# Patient Record
Sex: MALE | Race: OTHER | NOT HISPANIC OR LATINO | Employment: UNEMPLOYED | URBAN - NONMETROPOLITAN AREA
[De-identification: names, ages, dates, MRNs, and addresses within clinical notes are randomized per-mention and may not be internally consistent; named-entity substitution may affect disease eponyms.]

---

## 2021-01-29 ENCOUNTER — APPOINTMENT (EMERGENCY)
Dept: CT IMAGING | Facility: HOSPITAL | Age: 46
DRG: 720 | End: 2021-01-29
Payer: COMMERCIAL

## 2021-01-29 ENCOUNTER — APPOINTMENT (EMERGENCY)
Dept: RADIOLOGY | Facility: HOSPITAL | Age: 46
DRG: 720 | End: 2021-01-29
Payer: COMMERCIAL

## 2021-01-29 ENCOUNTER — HOSPITAL ENCOUNTER (INPATIENT)
Facility: HOSPITAL | Age: 46
LOS: 1 days | DRG: 720 | End: 2021-01-30
Attending: EMERGENCY MEDICINE | Admitting: FAMILY MEDICINE
Payer: COMMERCIAL

## 2021-01-29 DIAGNOSIS — R06.00 DYSPNEA: ICD-10-CM

## 2021-01-29 DIAGNOSIS — E03.9 HYPOTHYROIDISM: ICD-10-CM

## 2021-01-29 DIAGNOSIS — J90 PLEURAL EFFUSION: ICD-10-CM

## 2021-01-29 DIAGNOSIS — R07.9 CHEST PAIN: Primary | ICD-10-CM

## 2021-01-29 DIAGNOSIS — J90 PLEURAL EFFUSION, LEFT: ICD-10-CM

## 2021-01-29 DIAGNOSIS — J18.9 PNEUMONIA: ICD-10-CM

## 2021-01-29 LAB
ALBUMIN SERPL BCP-MCNC: 3 G/DL (ref 3.5–5)
ALP SERPL-CCNC: 119 U/L (ref 46–116)
ALT SERPL W P-5'-P-CCNC: 24 U/L (ref 12–78)
ANION GAP SERPL CALCULATED.3IONS-SCNC: 14 MMOL/L (ref 4–13)
AST SERPL W P-5'-P-CCNC: 13 U/L (ref 5–45)
BASOPHILS # BLD AUTO: 0.02 THOUSANDS/ΜL (ref 0–0.1)
BASOPHILS NFR BLD AUTO: 0 % (ref 0–1)
BILIRUB SERPL-MCNC: 0.89 MG/DL (ref 0.2–1)
BUN SERPL-MCNC: 15 MG/DL (ref 5–25)
CALCIUM ALBUM COR SERPL-MCNC: 10.1 MG/DL (ref 8.3–10.1)
CALCIUM SERPL-MCNC: 9.3 MG/DL (ref 8.3–10.1)
CHLORIDE SERPL-SCNC: 97 MMOL/L (ref 100–108)
CO2 SERPL-SCNC: 24 MMOL/L (ref 21–32)
CREAT SERPL-MCNC: 1.16 MG/DL (ref 0.6–1.3)
CRP SERPL QL: 370.5 MG/L
EOSINOPHIL # BLD AUTO: 0.19 THOUSAND/ΜL (ref 0–0.61)
EOSINOPHIL NFR BLD AUTO: 2 % (ref 0–6)
ERYTHROCYTE [DISTWIDTH] IN BLOOD BY AUTOMATED COUNT: 13.1 % (ref 11.6–15.1)
ERYTHROCYTE [SEDIMENTATION RATE] IN BLOOD: 130 MM/HOUR (ref 0–14)
FLUAV RNA RESP QL NAA+PROBE: NEGATIVE
FLUBV RNA RESP QL NAA+PROBE: NEGATIVE
GFR SERPL CREATININE-BSD FRML MDRD: 76 ML/MIN/1.73SQ M
GLUCOSE SERPL-MCNC: 101 MG/DL (ref 65–140)
HCT VFR BLD AUTO: 42.6 % (ref 36.5–49.3)
HGB BLD-MCNC: 14.2 G/DL (ref 12–17)
HIV 1+2 AB+HIV1 P24 AG SERPL QL IA: NORMAL
HIV1 P24 AG SER QL: NORMAL
IMM GRANULOCYTES # BLD AUTO: 0.04 THOUSAND/UL (ref 0–0.2)
IMM GRANULOCYTES NFR BLD AUTO: 0 % (ref 0–2)
LACTATE SERPL-SCNC: 1.1 MMOL/L (ref 0.5–2)
LYMPHOCYTES # BLD AUTO: 1.1 THOUSANDS/ΜL (ref 0.6–4.47)
LYMPHOCYTES NFR BLD AUTO: 8 % (ref 14–44)
MCH RBC QN AUTO: 30.7 PG (ref 26.8–34.3)
MCHC RBC AUTO-ENTMCNC: 33.3 G/DL (ref 31.4–37.4)
MCV RBC AUTO: 92 FL (ref 82–98)
MONOCYTES # BLD AUTO: 1.13 THOUSAND/ΜL (ref 0.17–1.22)
MONOCYTES NFR BLD AUTO: 9 % (ref 4–12)
NEUTROPHILS # BLD AUTO: 10.55 THOUSANDS/ΜL (ref 1.85–7.62)
NEUTS SEG NFR BLD AUTO: 81 % (ref 43–75)
NRBC BLD AUTO-RTO: 0 /100 WBCS
NT-PROBNP SERPL-MCNC: 26 PG/ML
PLATELET # BLD AUTO: 425 THOUSANDS/UL (ref 149–390)
PMV BLD AUTO: 9.8 FL (ref 8.9–12.7)
POTASSIUM SERPL-SCNC: 3.5 MMOL/L (ref 3.5–5.3)
PROT SERPL-MCNC: 8.5 G/DL (ref 6.4–8.2)
RBC # BLD AUTO: 4.62 MILLION/UL (ref 3.88–5.62)
RSV RNA RESP QL NAA+PROBE: NEGATIVE
SARS-COV-2 RNA RESP QL NAA+PROBE: NEGATIVE
SODIUM SERPL-SCNC: 135 MMOL/L (ref 136–145)
TROPONIN I SERPL-MCNC: <0.02 NG/ML
TSH SERPL DL<=0.05 MIU/L-ACNC: 8.52 UIU/ML (ref 0.36–3.74)
WBC # BLD AUTO: 13.03 THOUSAND/UL (ref 4.31–10.16)

## 2021-01-29 PROCEDURE — 71045 X-RAY EXAM CHEST 1 VIEW: CPT

## 2021-01-29 PROCEDURE — 96360 HYDRATION IV INFUSION INIT: CPT

## 2021-01-29 PROCEDURE — 80053 COMPREHEN METABOLIC PANEL: CPT | Performed by: EMERGENCY MEDICINE

## 2021-01-29 PROCEDURE — 84145 PROCALCITONIN (PCT): CPT | Performed by: EMERGENCY MEDICINE

## 2021-01-29 PROCEDURE — 87806 HIV AG W/HIV1&2 ANTB W/OPTIC: CPT | Performed by: EMERGENCY MEDICINE

## 2021-01-29 PROCEDURE — 86140 C-REACTIVE PROTEIN: CPT | Performed by: EMERGENCY MEDICINE

## 2021-01-29 PROCEDURE — 85025 COMPLETE CBC W/AUTO DIFF WBC: CPT | Performed by: EMERGENCY MEDICINE

## 2021-01-29 PROCEDURE — 85652 RBC SED RATE AUTOMATED: CPT | Performed by: EMERGENCY MEDICINE

## 2021-01-29 PROCEDURE — 71260 CT THORAX DX C+: CPT

## 2021-01-29 PROCEDURE — 84443 ASSAY THYROID STIM HORMONE: CPT | Performed by: EMERGENCY MEDICINE

## 2021-01-29 PROCEDURE — 0241U HB NFCT DS VIR RESP RNA 4 TRGT: CPT | Performed by: EMERGENCY MEDICINE

## 2021-01-29 PROCEDURE — 99291 CRITICAL CARE FIRST HOUR: CPT | Performed by: EMERGENCY MEDICINE

## 2021-01-29 PROCEDURE — 93005 ELECTROCARDIOGRAM TRACING: CPT

## 2021-01-29 PROCEDURE — 83605 ASSAY OF LACTIC ACID: CPT | Performed by: EMERGENCY MEDICINE

## 2021-01-29 PROCEDURE — 87040 BLOOD CULTURE FOR BACTERIA: CPT | Performed by: EMERGENCY MEDICINE

## 2021-01-29 PROCEDURE — 99285 EMERGENCY DEPT VISIT HI MDM: CPT

## 2021-01-29 PROCEDURE — 84484 ASSAY OF TROPONIN QUANT: CPT | Performed by: EMERGENCY MEDICINE

## 2021-01-29 PROCEDURE — 83880 ASSAY OF NATRIURETIC PEPTIDE: CPT | Performed by: EMERGENCY MEDICINE

## 2021-01-29 PROCEDURE — 99223 1ST HOSP IP/OBS HIGH 75: CPT | Performed by: FAMILY MEDICINE

## 2021-01-29 PROCEDURE — 36415 COLL VENOUS BLD VENIPUNCTURE: CPT | Performed by: EMERGENCY MEDICINE

## 2021-01-29 PROCEDURE — 84439 ASSAY OF FREE THYROXINE: CPT | Performed by: FAMILY MEDICINE

## 2021-01-29 RX ORDER — DOCUSATE SODIUM 100 MG/1
100 CAPSULE, LIQUID FILLED ORAL 2 TIMES DAILY PRN
Status: DISCONTINUED | OUTPATIENT
Start: 2021-01-29 | End: 2021-01-30 | Stop reason: HOSPADM

## 2021-01-29 RX ORDER — SODIUM CHLORIDE 9 MG/ML
125 INJECTION, SOLUTION INTRAVENOUS CONTINUOUS
Status: DISCONTINUED | OUTPATIENT
Start: 2021-01-29 | End: 2021-01-29

## 2021-01-29 RX ORDER — HYDROMORPHONE HCL/PF 1 MG/ML
1 SYRINGE (ML) INJECTION EVERY 4 HOURS PRN
Status: DISCONTINUED | OUTPATIENT
Start: 2021-01-29 | End: 2021-01-30 | Stop reason: HOSPADM

## 2021-01-29 RX ORDER — SODIUM CHLORIDE AND POTASSIUM CHLORIDE .9; .15 G/100ML; G/100ML
100 SOLUTION INTRAVENOUS CONTINUOUS
Status: DISCONTINUED | OUTPATIENT
Start: 2021-01-29 | End: 2021-01-30 | Stop reason: HOSPADM

## 2021-01-29 RX ORDER — ASPIRIN 81 MG/1
81 TABLET ORAL
COMMUNITY
End: 2021-02-03 | Stop reason: HOSPADM

## 2021-01-29 RX ORDER — ONDANSETRON 2 MG/ML
4 INJECTION INTRAMUSCULAR; INTRAVENOUS EVERY 4 HOURS PRN
Status: DISCONTINUED | OUTPATIENT
Start: 2021-01-29 | End: 2021-01-30 | Stop reason: HOSPADM

## 2021-01-29 RX ORDER — MORPHINE SULFATE 4 MG/ML
4 INJECTION, SOLUTION INTRAMUSCULAR; INTRAVENOUS ONCE
Status: COMPLETED | OUTPATIENT
Start: 2021-01-29 | End: 2021-01-29

## 2021-01-29 RX ORDER — HEPARIN SODIUM 5000 [USP'U]/ML
5000 INJECTION, SOLUTION INTRAVENOUS; SUBCUTANEOUS EVERY 8 HOURS SCHEDULED
Status: DISCONTINUED | OUTPATIENT
Start: 2021-01-29 | End: 2021-01-30

## 2021-01-29 RX ORDER — MAGNESIUM HYDROXIDE/ALUMINUM HYDROXICE/SIMETHICONE 120; 1200; 1200 MG/30ML; MG/30ML; MG/30ML
30 SUSPENSION ORAL EVERY 6 HOURS PRN
Status: DISCONTINUED | OUTPATIENT
Start: 2021-01-29 | End: 2021-01-30 | Stop reason: HOSPADM

## 2021-01-29 RX ORDER — NICOTINE 21 MG/24HR
1 PATCH, TRANSDERMAL 24 HOURS TRANSDERMAL DAILY
Status: DISCONTINUED | OUTPATIENT
Start: 2021-01-30 | End: 2021-01-30

## 2021-01-29 RX ORDER — ACETAMINOPHEN 325 MG/1
650 TABLET ORAL EVERY 6 HOURS PRN
Status: DISCONTINUED | OUTPATIENT
Start: 2021-01-29 | End: 2021-01-30 | Stop reason: HOSPADM

## 2021-01-29 RX ADMIN — SODIUM CHLORIDE 1000 ML: 0.9 INJECTION, SOLUTION INTRAVENOUS at 17:34

## 2021-01-29 RX ADMIN — SODIUM CHLORIDE 125 ML/HR: 0.9 INJECTION, SOLUTION INTRAVENOUS at 19:57

## 2021-01-29 RX ADMIN — SODIUM CHLORIDE AND POTASSIUM CHLORIDE 100 ML/HR: .9; .15 SOLUTION INTRAVENOUS at 23:56

## 2021-01-29 RX ADMIN — CEFTRIAXONE SODIUM 1000 MG: 10 INJECTION, POWDER, FOR SOLUTION INTRAVENOUS at 22:15

## 2021-01-29 RX ADMIN — DOXYCYCLINE 100 MG: 100 INJECTION, POWDER, LYOPHILIZED, FOR SOLUTION INTRAVENOUS at 22:53

## 2021-01-29 RX ADMIN — HEPARIN SODIUM 5000 UNITS: 5000 INJECTION INTRAVENOUS; SUBCUTANEOUS at 23:26

## 2021-01-29 RX ADMIN — IOHEXOL 85 ML: 350 INJECTION, SOLUTION INTRAVENOUS at 19:17

## 2021-01-29 RX ADMIN — MORPHINE SULFATE 4 MG: 4 INJECTION INTRAVENOUS at 19:24

## 2021-01-30 ENCOUNTER — APPOINTMENT (INPATIENT)
Dept: RADIOLOGY | Facility: HOSPITAL | Age: 46
DRG: 720 | End: 2021-01-30
Attending: RADIOLOGY
Payer: COMMERCIAL

## 2021-01-30 ENCOUNTER — HOSPITAL ENCOUNTER (INPATIENT)
Facility: HOSPITAL | Age: 46
LOS: 4 days | Discharge: HOME | DRG: 720 | End: 2021-02-03
Attending: HOSPITALIST | Admitting: HOSPITALIST
Payer: COMMERCIAL

## 2021-01-30 VITALS
HEIGHT: 72 IN | HEART RATE: 106 BPM | OXYGEN SATURATION: 91 % | DIASTOLIC BLOOD PRESSURE: 87 MMHG | SYSTOLIC BLOOD PRESSURE: 121 MMHG | BODY MASS INDEX: 33.26 KG/M2 | TEMPERATURE: 98.8 F | WEIGHT: 245.6 LBS | RESPIRATION RATE: 16 BRPM

## 2021-01-30 DIAGNOSIS — J45.20 MILD INTERMITTENT ASTHMA, UNSPECIFIED WHETHER COMPLICATED: ICD-10-CM

## 2021-01-30 DIAGNOSIS — J90 PLEURAL EFFUSION, LEFT: Primary | ICD-10-CM

## 2021-01-30 DIAGNOSIS — Z72.0 TOBACCO ABUSE: Chronic | ICD-10-CM

## 2021-01-30 DIAGNOSIS — R93.89 ABNORMAL FINDING ON CT SCAN: ICD-10-CM

## 2021-01-30 DIAGNOSIS — K59.00 CONSTIPATION: ICD-10-CM

## 2021-01-30 DIAGNOSIS — E03.9 ACQUIRED HYPOTHYROIDISM: Chronic | ICD-10-CM

## 2021-01-30 DIAGNOSIS — A41.9 SEPSIS (HCC): ICD-10-CM

## 2021-01-30 LAB
ALBUMIN SERPL BCP-MCNC: 2.2 G/DL (ref 3.5–5)
ALP SERPL-CCNC: 102 U/L (ref 46–116)
ALT SERPL W P-5'-P-CCNC: 19 U/L (ref 12–78)
AMPHETAMINES SERPL QL SCN: NEGATIVE
ANION GAP SERPL CALCULATED.3IONS-SCNC: 14 MMOL/L (ref 4–13)
APPEARANCE FLD: NORMAL
AST SERPL W P-5'-P-CCNC: 13 U/L (ref 5–45)
BACTERIA UR QL AUTO: NORMAL /HPF
BARBITURATES UR QL: NEGATIVE
BASOPHILS # BLD AUTO: 0.03 THOUSANDS/ΜL (ref 0–0.1)
BASOPHILS NFR BLD AUTO: 0 % (ref 0–1)
BENZODIAZ UR QL: NEGATIVE
BILIRUB SERPL-MCNC: 0.62 MG/DL (ref 0.2–1)
BILIRUB UR QL STRIP: ABNORMAL
BUN SERPL-MCNC: 12 MG/DL (ref 5–25)
CALCIUM ALBUM COR SERPL-MCNC: 9.7 MG/DL (ref 8.3–10.1)
CALCIUM SERPL-MCNC: 8.3 MG/DL (ref 8.3–10.1)
CHLORIDE SERPL-SCNC: 101 MMOL/L (ref 100–108)
CHOLEST FLD-MCNC: 121 MG/DL
CHOLEST SERPL-MCNC: 147 MG/DL (ref 50–200)
CLARITY UR: CLEAR
CO2 SERPL-SCNC: 18 MMOL/L (ref 21–32)
COCAINE UR QL: NEGATIVE
COLOR FLD: YELLOW
COLOR UR: ABNORMAL
CREAT SERPL-MCNC: 0.88 MG/DL (ref 0.6–1.3)
EOSINOPHIL # BLD AUTO: 0.32 THOUSAND/ΜL (ref 0–0.61)
EOSINOPHIL NFR BLD AUTO: 3 % (ref 0–6)
ERYTHROCYTE [DISTWIDTH] IN BLOOD BY AUTOMATED COUNT: 13.2 % (ref 11.6–15.1)
GFR SERPL CREATININE-BSD FRML MDRD: 104 ML/MIN/1.73SQ M
GLUCOSE FLD-MCNC: <1 MG/DL
GLUCOSE SERPL-MCNC: 79 MG/DL (ref 65–140)
GLUCOSE UR STRIP-MCNC: NEGATIVE MG/DL
HCT VFR BLD AUTO: 38.9 % (ref 36.5–49.3)
HDLC SERPL-MCNC: 29 MG/DL
HGB BLD-MCNC: 12.8 G/DL (ref 12–17)
HGB UR QL STRIP.AUTO: NEGATIVE
IMM GRANULOCYTES # BLD AUTO: 0.05 THOUSAND/UL (ref 0–0.2)
IMM GRANULOCYTES NFR BLD AUTO: 1 % (ref 0–2)
INR PPP: 1.04 (ref 0.84–1.19)
KETONES UR STRIP-MCNC: ABNORMAL MG/DL
LDH FLD L TO P-CCNC: 3268 U/L
LDH SERPL-CCNC: 119 U/L (ref 81–234)
LDH SERPL-CCNC: 123 U/L (ref 81–234)
LDLC SERPL CALC-MCNC: 101 MG/DL (ref 0–100)
LEUKOCYTE ESTERASE UR QL STRIP: NEGATIVE
LYMPHOCYTES # BLD AUTO: 1.35 THOUSANDS/ΜL (ref 0.6–4.47)
LYMPHOCYTES NFR BLD AUTO: 13 % (ref 14–44)
LYMPHOCYTES NFR BLD AUTO: 8 %
MAGNESIUM SERPL-MCNC: 1.8 MG/DL (ref 1.6–2.6)
MCH RBC QN AUTO: 30.7 PG (ref 26.8–34.3)
MCHC RBC AUTO-ENTMCNC: 32.9 G/DL (ref 31.4–37.4)
MCV RBC AUTO: 93 FL (ref 82–98)
METHADONE UR QL: NEGATIVE
MONO+MESO NFR FLD MANUAL: 1 %
MONOCYTES # BLD AUTO: 0.89 THOUSAND/ΜL (ref 0.17–1.22)
MONOCYTES NFR BLD AUTO: 6 %
MONOCYTES NFR BLD AUTO: 9 % (ref 4–12)
NEUTROPHILS # BLD AUTO: 7.7 THOUSANDS/ΜL (ref 1.85–7.62)
NEUTS SEG NFR BLD AUTO: 74 % (ref 43–75)
NEUTS SEG NFR BLD AUTO: 85 %
NITRITE UR QL STRIP: NEGATIVE
NON-SQ EPI CELLS URNS QL MICRO: NORMAL /HPF
NRBC BLD AUTO-RTO: 0 /100 WBCS
OPIATES UR QL SCN: POSITIVE
OXYCODONE+OXYMORPHONE UR QL SCN: NEGATIVE
PCP UR QL: NEGATIVE
PH BODY FLUID: 7.5
PH UR STRIP.AUTO: 6.5 [PH]
PHOSPHATE SERPL-MCNC: 2.4 MG/DL (ref 2.7–4.5)
PLATELET # BLD AUTO: 382 THOUSANDS/UL (ref 149–390)
PMV BLD AUTO: 9.3 FL (ref 8.9–12.7)
POTASSIUM SERPL-SCNC: 3.7 MMOL/L (ref 3.5–5.3)
PROCALCITONIN SERPL-MCNC: 0.42 NG/ML
PROT FLD-MCNC: 5.7 G/DL
PROT SERPL-MCNC: 7 G/DL (ref 6.4–8.2)
PROT UR STRIP-MCNC: ABNORMAL MG/DL
PROTHROMBIN TIME: 13.4 SECONDS (ref 11.6–14.5)
RBC # BLD AUTO: 4.17 MILLION/UL (ref 3.88–5.62)
RBC #/AREA URNS AUTO: NORMAL /HPF
SITE: NORMAL
SODIUM SERPL-SCNC: 133 MMOL/L (ref 136–145)
SP GR UR STRIP.AUTO: 1.01 (ref 1–1.03)
T4 FREE SERPL-MCNC: 0.99 NG/DL (ref 0.76–1.46)
THC UR QL: POSITIVE
TOTAL CELLS COUNTED SPEC: 100
TRIGL FLD-MCNC: 54 MG/DL
TRIGL SERPL-MCNC: 86 MG/DL
UROBILINOGEN UR QL STRIP.AUTO: 2 E.U./DL
WBC # BLD AUTO: 10.34 THOUSAND/UL (ref 4.31–10.16)
WBC # FLD MANUAL: 6658 /UL
WBC #/AREA URNS AUTO: NORMAL /HPF

## 2021-01-30 PROCEDURE — NC001 PR NO CHARGE: Performed by: RADIOLOGY

## 2021-01-30 PROCEDURE — 85610 PROTHROMBIN TIME: CPT | Performed by: FAMILY MEDICINE

## 2021-01-30 PROCEDURE — 84478 ASSAY OF TRIGLYCERIDES: CPT | Performed by: FAMILY MEDICINE

## 2021-01-30 PROCEDURE — 32557 INSERT CATH PLEURA W/ IMAGE: CPT

## 2021-01-30 PROCEDURE — 82465 ASSAY BLD/SERUM CHOLESTEROL: CPT | Performed by: FAMILY MEDICINE

## 2021-01-30 PROCEDURE — 83615 LACTATE (LD) (LDH) ENZYME: CPT | Performed by: STUDENT IN AN ORGANIZED HEALTH CARE EDUCATION/TRAINING PROGRAM

## 2021-01-30 PROCEDURE — 80061 LIPID PANEL: CPT | Performed by: FAMILY MEDICINE

## 2021-01-30 PROCEDURE — 81001 URINALYSIS AUTO W/SCOPE: CPT | Performed by: FAMILY MEDICINE

## 2021-01-30 PROCEDURE — 87070 CULTURE OTHR SPECIMN AEROBIC: CPT | Performed by: FAMILY MEDICINE

## 2021-01-30 PROCEDURE — 80053 COMPREHEN METABOLIC PANEL: CPT | Performed by: FAMILY MEDICINE

## 2021-01-30 PROCEDURE — 88112 CYTOPATH CELL ENHANCE TECH: CPT | Performed by: PATHOLOGY

## 2021-01-30 PROCEDURE — 89051 BODY FLUID CELL COUNT: CPT | Performed by: FAMILY MEDICINE

## 2021-01-30 PROCEDURE — 32557 INSERT CATH PLEURA W/ IMAGE: CPT | Performed by: RADIOLOGY

## 2021-01-30 PROCEDURE — 88305 TISSUE EXAM BY PATHOLOGIST: CPT | Performed by: PATHOLOGY

## 2021-01-30 PROCEDURE — C1729 CATH, DRAINAGE: HCPCS

## 2021-01-30 PROCEDURE — C1769 GUIDE WIRE: HCPCS

## 2021-01-30 PROCEDURE — 87449 NOS EACH ORGANISM AG IA: CPT | Performed by: FAMILY MEDICINE

## 2021-01-30 PROCEDURE — 87205 SMEAR GRAM STAIN: CPT | Performed by: FAMILY MEDICINE

## 2021-01-30 PROCEDURE — 83615 LACTATE (LD) (LDH) ENZYME: CPT | Performed by: NURSE PRACTITIONER

## 2021-01-30 PROCEDURE — 83735 ASSAY OF MAGNESIUM: CPT | Performed by: FAMILY MEDICINE

## 2021-01-30 PROCEDURE — 83615 LACTATE (LD) (LDH) ENZYME: CPT | Performed by: FAMILY MEDICINE

## 2021-01-30 PROCEDURE — 99239 HOSP IP/OBS DSCHRG MGMT >30: CPT | Performed by: FAMILY MEDICINE

## 2021-01-30 PROCEDURE — 83986 ASSAY PH BODY FLUID NOS: CPT | Performed by: FAMILY MEDICINE

## 2021-01-30 PROCEDURE — 84100 ASSAY OF PHOSPHORUS: CPT | Performed by: FAMILY MEDICINE

## 2021-01-30 PROCEDURE — 84157 ASSAY OF PROTEIN OTHER: CPT | Performed by: FAMILY MEDICINE

## 2021-01-30 PROCEDURE — NC001 PR NO CHARGE: Performed by: HOSPITALIST

## 2021-01-30 PROCEDURE — 80307 DRUG TEST PRSMV CHEM ANLYZR: CPT | Performed by: FAMILY MEDICINE

## 2021-01-30 PROCEDURE — 82945 GLUCOSE OTHER FLUID: CPT | Performed by: FAMILY MEDICINE

## 2021-01-30 PROCEDURE — 99152 MOD SED SAME PHYS/QHP 5/>YRS: CPT | Performed by: RADIOLOGY

## 2021-01-30 PROCEDURE — 85025 COMPLETE CBC W/AUTO DIFF WBC: CPT | Performed by: FAMILY MEDICINE

## 2021-01-30 RX ORDER — LIDOCAINE WITH 8.4% SOD BICARB 0.9%(10ML)
SYRINGE (ML) INJECTION CODE/TRAUMA/SEDATION MEDICATION
Status: COMPLETED | OUTPATIENT
Start: 2021-01-30 | End: 2021-01-30

## 2021-01-30 RX ORDER — ONDANSETRON 2 MG/ML
4 INJECTION INTRAMUSCULAR; INTRAVENOUS EVERY 4 HOURS PRN
Status: CANCELLED | OUTPATIENT
Start: 2021-01-30

## 2021-01-30 RX ORDER — SODIUM CHLORIDE AND POTASSIUM CHLORIDE .9; .15 G/100ML; G/100ML
100 SOLUTION INTRAVENOUS CONTINUOUS
Status: CANCELLED | OUTPATIENT
Start: 2021-01-30

## 2021-01-30 RX ORDER — FENTANYL CITRATE 50 UG/ML
INJECTION, SOLUTION INTRAMUSCULAR; INTRAVENOUS CODE/TRAUMA/SEDATION MEDICATION
Status: COMPLETED | OUTPATIENT
Start: 2021-01-30 | End: 2021-01-30

## 2021-01-30 RX ORDER — ONDANSETRON 2 MG/ML
4 INJECTION INTRAMUSCULAR; INTRAVENOUS EVERY 4 HOURS PRN
Status: DISCONTINUED | OUTPATIENT
Start: 2021-01-30 | End: 2021-02-03 | Stop reason: HOSPADM

## 2021-01-30 RX ORDER — ACETAMINOPHEN 325 MG/1
650 TABLET ORAL EVERY 6 HOURS PRN
Status: CANCELLED | OUTPATIENT
Start: 2021-01-30

## 2021-01-30 RX ORDER — DOCUSATE SODIUM 100 MG/1
100 CAPSULE, LIQUID FILLED ORAL 2 TIMES DAILY PRN
Status: CANCELLED | OUTPATIENT
Start: 2021-01-30

## 2021-01-30 RX ORDER — DOCUSATE SODIUM 100 MG/1
100 CAPSULE, LIQUID FILLED ORAL 2 TIMES DAILY PRN
Status: DISCONTINUED | OUTPATIENT
Start: 2021-01-30 | End: 2021-02-03 | Stop reason: HOSPADM

## 2021-01-30 RX ORDER — LEVOTHYROXINE SODIUM 0.07 MG/1
75 TABLET ORAL
Status: DISCONTINUED | OUTPATIENT
Start: 2021-01-31 | End: 2021-01-30

## 2021-01-30 RX ORDER — LEVOTHYROXINE SODIUM 0.07 MG/1
75 TABLET ORAL
Status: CANCELLED | OUTPATIENT
Start: 2021-01-31

## 2021-01-30 RX ORDER — MIDAZOLAM HYDROCHLORIDE 2 MG/2ML
INJECTION, SOLUTION INTRAMUSCULAR; INTRAVENOUS CODE/TRAUMA/SEDATION MEDICATION
Status: COMPLETED | OUTPATIENT
Start: 2021-01-30 | End: 2021-01-30

## 2021-01-30 RX ORDER — NICOTINE 21 MG/24HR
21 PATCH, TRANSDERMAL 24 HOURS TRANSDERMAL DAILY
Status: DISCONTINUED | OUTPATIENT
Start: 2021-01-31 | End: 2021-01-31

## 2021-01-30 RX ORDER — SODIUM CHLORIDE AND POTASSIUM CHLORIDE .9; .15 G/100ML; G/100ML
100 SOLUTION INTRAVENOUS CONTINUOUS
Status: DISCONTINUED | OUTPATIENT
Start: 2021-01-30 | End: 2021-01-31

## 2021-01-30 RX ORDER — HYDROMORPHONE HCL/PF 1 MG/ML
1 SYRINGE (ML) INJECTION EVERY 4 HOURS PRN
Status: DISCONTINUED | OUTPATIENT
Start: 2021-01-30 | End: 2021-02-03 | Stop reason: HOSPADM

## 2021-01-30 RX ORDER — HYDROMORPHONE HCL/PF 1 MG/ML
1 SYRINGE (ML) INJECTION EVERY 4 HOURS PRN
Status: CANCELLED | OUTPATIENT
Start: 2021-01-30

## 2021-01-30 RX ORDER — SODIUM CHLORIDE 9 MG/ML
75 INJECTION, SOLUTION INTRAVENOUS CONTINUOUS
Status: CANCELLED | OUTPATIENT
Start: 2021-01-30

## 2021-01-30 RX ORDER — SODIUM CHLORIDE, SODIUM GLUCONATE, SODIUM ACETATE, POTASSIUM CHLORIDE, MAGNESIUM CHLORIDE, SODIUM PHOSPHATE, DIBASIC, AND POTASSIUM PHOSPHATE .53; .5; .37; .037; .03; .012; .00082 G/100ML; G/100ML; G/100ML; G/100ML; G/100ML; G/100ML; G/100ML
100 INJECTION, SOLUTION INTRAVENOUS CONTINUOUS
Status: DISCONTINUED | OUTPATIENT
Start: 2021-01-30 | End: 2021-01-30

## 2021-01-30 RX ORDER — LEVOTHYROXINE SODIUM 0.07 MG/1
75 TABLET ORAL
Status: DISCONTINUED | OUTPATIENT
Start: 2021-01-30 | End: 2021-01-30 | Stop reason: HOSPADM

## 2021-01-30 RX ORDER — ALBUTEROL SULFATE 90 UG/1
2 AEROSOL, METERED RESPIRATORY (INHALATION) EVERY 4 HOURS PRN
Status: DISCONTINUED | OUTPATIENT
Start: 2021-01-30 | End: 2021-02-03 | Stop reason: HOSPADM

## 2021-01-30 RX ORDER — ACETAMINOPHEN 325 MG/1
650 TABLET ORAL EVERY 6 HOURS PRN
Status: DISCONTINUED | OUTPATIENT
Start: 2021-01-30 | End: 2021-01-31

## 2021-01-30 RX ADMIN — MIDAZOLAM 0.5 MG: 1 INJECTION INTRAMUSCULAR; INTRAVENOUS at 19:57

## 2021-01-30 RX ADMIN — LEVOTHYROXINE SODIUM 75 MCG: 75 TABLET ORAL at 05:08

## 2021-01-30 RX ADMIN — HYDROMORPHONE HYDROCHLORIDE 1 MG: 1 INJECTION, SOLUTION INTRAMUSCULAR; INTRAVENOUS; SUBCUTANEOUS at 23:53

## 2021-01-30 RX ADMIN — SODIUM CHLORIDE AND POTASSIUM CHLORIDE 100 ML/HR: .9; .15 SOLUTION INTRAVENOUS at 09:49

## 2021-01-30 RX ADMIN — PIPERACILLIN AND TAZOBACTAM 3.38 G: 36; 4.5 INJECTION, POWDER, FOR SOLUTION INTRAVENOUS at 21:06

## 2021-01-30 RX ADMIN — SODIUM CHLORIDE AND POTASSIUM CHLORIDE 100 ML/HR: .9; .15 SOLUTION INTRAVENOUS at 19:11

## 2021-01-30 RX ADMIN — PIPERACILLIN AND TAZOBACTAM 3.38 G: 3; .375 INJECTION, POWDER, LYOPHILIZED, FOR SOLUTION INTRAVENOUS at 12:54

## 2021-01-30 RX ADMIN — FENTANYL CITRATE 25 MCG: 50 INJECTION INTRAMUSCULAR; INTRAVENOUS at 20:03

## 2021-01-30 RX ADMIN — HYDROMORPHONE HYDROCHLORIDE 1 MG: 1 INJECTION, SOLUTION INTRAMUSCULAR; INTRAVENOUS; SUBCUTANEOUS at 06:09

## 2021-01-30 RX ADMIN — VANCOMYCIN HYDROCHLORIDE 1250 MG: 5 INJECTION, POWDER, LYOPHILIZED, FOR SOLUTION INTRAVENOUS at 13:39

## 2021-01-30 RX ADMIN — FENTANYL CITRATE 25 MCG: 50 INJECTION INTRAMUSCULAR; INTRAVENOUS at 19:58

## 2021-01-30 RX ADMIN — VANCOMYCIN HYDROCHLORIDE 1250 MG: 10 INJECTION, POWDER, LYOPHILIZED, FOR SOLUTION INTRAVENOUS at 22:12

## 2021-01-30 RX ADMIN — DOXYCYCLINE 100 MG: 100 INJECTION, POWDER, LYOPHILIZED, FOR SOLUTION INTRAVENOUS at 09:47

## 2021-01-30 RX ADMIN — HEPARIN SODIUM 5000 UNITS: 5000 INJECTION INTRAVENOUS; SUBCUTANEOUS at 05:08

## 2021-01-30 RX ADMIN — ACETAMINOPHEN 650 MG: 325 TABLET ORAL at 22:13

## 2021-01-30 RX ADMIN — Medication 10 ML: at 20:05

## 2021-01-30 RX ADMIN — HYDROMORPHONE HYDROCHLORIDE 1 MG: 1 INJECTION, SOLUTION INTRAMUSCULAR; INTRAVENOUS; SUBCUTANEOUS at 15:00

## 2021-01-30 NOTE — TELEMEDICINE
INTERPROFESSIONAL (PHONE) CONSULTATION - Interventional Radiology  Farrah Herndon 39 y o  male MRN: [id number]  Unit/Bed#: -01 Encounter: [id number]    IR has been consulted to evaluate the patient, determine the appropriate procedure, and whether or not a procedure can and should be performed regarding the care of Farrah Herndon     We were consulted by [facility] concerning a moderate to large possibly infected left effusion, and to possibly perform a chest tube placement if medically appropriate for the patient  IP Consult to IR  Consult performed by: Mena Brenner MD  Consult ordered by: Mark Figueroa DO        01/30/21      Assessment/Recommendation:   49-year-old female with rapidly enlarging left effusion  The effusion may be loculated by imaging on CT  Patient meets 3 of 4 sirs criteria  Currently he is hemodynamically stable with mild tachycardia  His O2 sat is between 89 and 91 at rest on room air  The effusion is largely basilar, and is amenable to percutaneous drainage  Plan to place a chest tube today under image guidance  If there is poor output or the effusion looks very loculated, will consider pharmacologic intervention with tPA/Pulmonase  Total time spent in review of data, discussion with requesting provider and rendering advice was 15 minutes  Patient or appropriate family member was verbally informed by SLIM of this consultative service on their behalf to provide more timely access to specialty care in lieu of an in person consultation  They were informed it is a billable service unless the it was determined an in person follow up was medically necessary by me within the next 14 days at which time only the in person consult would be billed  Verbal consent was obtained  Thank you for allowing Interventional Radiology to participate in the care of Farrah Herndon  Please don't hesitate to call or TigerText us with any questions       Mena Brenner MD

## 2021-01-30 NOTE — PLAN OF CARE
Problem: PAIN - ADULT  Goal: Verbalizes/displays adequate comfort level or baseline comfort level  Description: Interventions:  - Encourage patient to monitor pain and request assistance  - Assess pain using appropriate pain scale  - Administer analgesics based on type and severity of pain and evaluate response  - Implement non-pharmacological measures as appropriate and evaluate response  - Consider cultural and social influences on pain and pain management  - Notify physician/advanced practitioner if interventions unsuccessful or patient reports new pain  Outcome: Progressing     Problem: INFECTION - ADULT  Goal: Absence or prevention of progression during hospitalization  Description: INTERVENTIONS:  - Assess and monitor for signs and symptoms of infection  - Monitor lab/diagnostic results  - Monitor all insertion sites, i e  indwelling lines, tubes, and drains  - Monitor endotracheal if appropriate and nasal secretions for changes in amount and color  - [place] appropriate cooling/warming therapies per order  - Administer medications as ordered  - Instruct and encourage patient and family to use good hand hygiene technique  - Identify and instruct in appropriate isolation precautions for identified infection/condition  Outcome: Progressing     Problem: SAFETY ADULT  Goal: Patient will remain free of falls  Description: INTERVENTIONS:  - Assess patient frequently for physical needs  -  Identify cognitive and physical deficits and behaviors that affect risk of falls    -  [place] fall precautions as indicated by assessment   - Educate patient/family on patient safety including physical limitations  - Instruct patient to call for assistance with activity based on assessment  - Modify environment to reduce risk of injury  - Consider OT/PT consult to assist with strengthening/mobility  Outcome: Progressing  Goal: Maintain or return to baseline ADL function  Description: INTERVENTIONS:  -  Assess patient's ability to carry out ADLs; assess patient's baseline for ADL function and identify physical deficits which impact ability to perform ADLs (bathing, care of mouth/teeth, toileting, grooming, dressing, etc )  - Assess/evaluate cause of self-care deficits   - Assess range of motion  - Assess patient's mobility; develop plan if impaired  - Assess patient's need for assistive devices and provide as appropriate  - Encourage maximum independence but intervene and supervise when necessary  - Involve family in performance of ADLs  - Assess for home care needs following discharge   - Consider OT consult to assist with ADL evaluation and planning for discharge  - Provide patient education as appropriate  Outcome: Progressing  Goal: Maintain or return mobility status to optimal level  Description: INTERVENTIONS:  - Assess patient's baseline mobility status (ambulation, transfers, stairs, etc )    - Identify cognitive and physical deficits and behaviors that affect mobility  - Identify mobility aids required to assist with transfers and/or ambulation (gait belt, sit-to-stand, lift, walker, cane, etc )  - [place] fall precautions as indicated by assessment  - Record patient progress and toleration of activity level on Mobility SBAR; progress patient to next Phase/Stage  - Instruct patient to call for assistance with activity based on assessment  - Consider rehabilitation consult to assist with strengthening/weightbearing, etc   Outcome: Progressing     Problem: DISCHARGE PLANNING  Goal: Discharge to home or other facility with appropriate resources  Description: INTERVENTIONS:  - Identify barriers to discharge w/patient and caregiver  - Arrange for needed discharge resources and transportation as appropriate  - Identify discharge learning needs (meds, wound care, etc )  - Arrange for interpretive services to assist at discharge as needed  - Refer to Case Management Department for coordinating discharge planning if the patient needs post-hospital services based on physician/advanced practitioner order or complex needs related to functional status, cognitive ability, or social support system  Outcome: Progressing     Problem: Knowledge Deficit  Goal: Patient/family/caregiver demonstrates understanding of disease process, treatment plan, medications, and discharge instructions  Description: Complete learning assessment and assess knowledge base    Interventions:  - Provide teaching at level of understanding  - Provide teaching via preferred learning methods  Outcome: Progressing

## 2021-01-30 NOTE — H&P
INTERNAL MEDICINE RESIDENCY ADMISSION H&P     Name: Helen White   Age & Sex: 39 y o  male   MRN: [id number]  Unit/Bed#: -01   Encounter: [id number]  Primary Care Provider: No primary care provider on file  Code Status: Level 1 - Full Code  Admission Status: INPATIENT   Disposition: Patient requires Med/Surg    Admit to team: SOD Team A    ASSESSMENT/PLAN     Principal Problem:    Pleural effusion, left  Active Problems:    Acquired hypothyroidism    History of DVT (deep vein thrombosis)    Tobacco abuse    Sepsis (HCC)    Mild intermittent asthma      * Pleural effusion, left  Assessment & Plan  Left chest discomfort and shortness of breath started 1/23, per patient was started on Z-Mario  1/25 worsening shortness of breath and chest pain, went back to the ED, was treated with meds - not in the EMR - with temporary relief  1/29 patient went to the ED at [facility] x-ray chest and CT chest showing moderate-to-large loculated left pleural effusion and compressive atelectasis left lung      - IR Consult and following, may need chest tube   - continue Zosyn and vancomycin  - NPO   - IV fluids hydration   - Serum LD level       Mild intermittent asthma  Assessment & Plan  Hx of mild intermittent asthma "more when was a child"   Not on any bronchodilator  Mild b/l wheezing on exam 01/30     Plan   Albuterol inhaler prn     Sepsis [facility])  Assessment & Plan  01/29 SLG "Patient meets 3 of 4 SIRS criteria with leukocytosis, tachycardia, and tachypnea"     Plan  Follow-up blood cultures  Continue Vanco and Zosyn  Monitor vitals and trend white blood cells    Tobacco abuse  Assessment & Plan  Smoking 1 pack a day since age 25  Quit since January 25 in context of shortness of breath and chest pain    Plan  Patient offered nicotine patches and he declined  Nicoderm 21 g daily p r n      History of DVT (deep vein thrombosis)  Assessment & Plan  DVT left leg in 2010 secondary to cellulitis due to injury  Patient was on Coumadin for 1 year afterward    Plan  Lovenox 40 mg subcutaneous daily    Acquired hypothyroidism  Assessment & Plan  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 16 s/p radioactive iodine  Patient not talking Levothyroxine for "Many" years and denies any related symptoms   01/29 TSH 8 5 and T4 0 99   Levothyroxine 50 mcg was started before transfer to [facility] levothyroxine given TSH less than 10 and patient is asymptomatic  Monitor for any symptoms or signs  Establish care and follow-up outpatient with PCP      VTE Pharmacologic Prophylaxis: Enoxaparin (Lovenox)  VTE Mechanical Prophylaxis: sequential compression device    CHIEF COMPLAINT   No chief complaint on file  HISTORY OF PRESENT ILLNESS   Patient is a 39years old with past medical history significant of remote goiter disease at age 16 s/p urology active iodine, and provoked DVT in 2010, on medication only daily aspirin 81 mg, transferred to \[facility]\"" for left pleural effusion concerning of empyema versus abscess  Symptoms started 1/23, left chest discomfort/pain and shortness of breath, status post Z-Mario, is gradually worsening over time, he went to the ED 1/29, x-ray chest and CT scan showed the moderate to large left loculated pleural effusion was compressive atelectasis on the left lung  Patient notes fever/chills, diaphoresis, or dyspnea on exertion, left chest pain "feels like having broken ribs" worsening when taking deep breath  Social  Smoke 1 pack a day since age 16, stopped 01/25   Occasional alcohol, monthly  Admits smoking marijuana weekly  denies any anxiety or withdrawal symptoms      REVIEW OF SYSTEMS     Review of Systems   Constitutional: Positive for chills, diaphoresis and fever  Negative for activity change and unexpected weight change  HENT: Negative for mouth sores, rhinorrhea, sneezing, sore throat and trouble swallowing  Eyes: Negative for visual disturbance  Respiratory: Positive for cough  Productive - clear secretions over past two days    Cardiovascular: Positive for chest pain  Negative for palpitations  Left (ribs) chest pain worsening on cough/ deep breath   Gastrointestinal: Negative for abdominal pain, blood in stool, diarrhea, nausea and vomiting  Genitourinary: Negative for decreased urine volume and difficulty urinating  Neurological: Negative for syncope and weakness  Psychiatric/Behavioral: Negative for agitation  OBJECTIVE     Vitals:    21   BP: 158/100 162/86 144/79 123/86   Pulse: (!) 124 (!) 120 (!) 118 (!) 122   Resp: 18 18 18    Temp:       TempSrc:       SpO2: 97% 96% 96% 91%   Height:          Temperature:   Temp (24hrs), Av 6 °F (37 °C), Min:98 3 °F (36 8 °C), Max:98 8 °F (37 1 °C)    Temperature: 98 3 °F (36 8 °C)  Intake & Output:  I/O     None        Weights:   IBW: 77 6 kg    Body mass index is 33 31 kg/m²  Weight (last 2 days)     None        Physical Exam  Constitutional:       General: He is in acute distress  Appearance: He is normal weight  He is diaphoretic  HENT:      Head: Normocephalic and atraumatic  Nose: Nose normal       Mouth/Throat:      Mouth: Mucous membranes are moist       Pharynx: Oropharynx is clear  No oropharyngeal exudate or posterior oropharyngeal erythema  Eyes:      General: No scleral icterus  Right eye: No discharge  Left eye: No discharge  Extraocular Movements: Extraocular movements intact  Conjunctiva/sclera: Conjunctivae normal       Pupils: Pupils are equal, round, and reactive to light  Neck:      Musculoskeletal: Normal range of motion and neck supple  No neck rigidity or muscular tenderness  Cardiovascular:      Rate and Rhythm: Normal rate and regular rhythm  Pulses: Normal pulses  Heart sounds: Normal heart sounds  No murmur  No gallop      Pulmonary:      Comments: Shallow breathing due to pain, SpO2 low 90s room air, severely tender left lower and posterior ribs 7-9, b/l minimally expiratory wheezing  Abdominal:      General: Bowel sounds are normal  There is no distension  Palpations: Abdomen is soft  There is no mass  Tenderness: There is no abdominal tenderness  Musculoskeletal:      Right lower leg: No edema  Left lower leg: No edema  Lymphadenopathy:      Cervical: No cervical adenopathy  Skin:     General: Skin is warm  Capillary Refill: Capillary refill takes less than 2 seconds  Neurological:      General: No focal deficit present  Mental Status: He is alert and oriented to person, place, and time  Psychiatric:         Mood and Affect: Mood normal          Behavior: Behavior normal          Thought Content: Thought content normal          Judgment: Judgment normal        PAST MEDICAL HISTORY     Past Medical History:   Diagnosis Date    Acquired hypothyroidism     DVT (deep venous thrombosis) (HCC) left leg    History of DVT (deep vein thrombosis)     Hypertension     Hyperthyroidism     Pleural effusion, left 1/29/2021    SIRS (systemic inflammatory response syndrome) (HCC)     Tobacco abuse      PAST SURGICAL HISTORY     Past Surgical History:   Procedure Laterality Date    FRACTURE SURGERY       SOCIAL & FAMILY HISTORY     Social History     Substance and Sexual Activity   Alcohol Use Not Currently     Substance and Sexual Activity   Alcohol Use Not Currently        Substance and Sexual Activity   Drug Use Yes    Types: Marijuana     Social History     Tobacco Use   Smoking Status Current Every Day Smoker   Smokeless Tobacco Never Used     No family history on file  LABORATORY DATA     Labs: I have personally reviewed pertinent reports      Results from last 7 days   Lab Units 01/30/21  0558 01/29/21  1654   WBC Thousand/uL 10 34* 13 03*   HEMOGLOBIN g/dL 12 8 14 2   HEMATOCRIT % 38 9 42 6   PLATELETS Thousands/uL 382 425*   NEUTROS PCT % 74 81*   MONOS PCT % 9 9 Results from last 7 days   Lab Units 01/30/21  0558 01/29/21  1654   POTASSIUM mmol/L 3 7 3 5   CHLORIDE mmol/L 101 97*   CO2 mmol/L 18* 24   BUN mg/dL 12 15   CREATININE mg/dL 0 88 1 16   CALCIUM mg/dL 8 3 9 3   ALK PHOS U/L 102 119*   ALT U/L 19 24   AST U/L 13 13     Results from last 7 days   Lab Units 01/30/21  0558   MAGNESIUM mg/dL 1 8     Results from last 7 days   Lab Units 01/30/21  0558   PHOSPHORUS mg/dL 2 4*      Results from last 7 days   Lab Units 01/30/21  0558   INR  1 04     Results from last 7 days   Lab Units 01/29/21  1654   LACTIC ACID mmol/L 1 1     Results from last 7 days   Lab Units 01/29/21  1839   TROPONIN I ng/mL <0 02     Micro:  Lab Results   Component Value Date    BLOODCX No Growth at 24 hrs  01/29/2021    BLOODCX No Growth at 24 hrs  01/29/2021     IMAGING & DIAGNOSTIC TESTS     Imaging: I have personally reviewed pertinent reports  Xr Chest 1 View Portable    Result Date: 1/29/2021  Impression: Moderate size left pleural effusion  No pneumothorax  Workstation performed: [id number]     Ct Chest With Contrast    Result Date: 1/29/2021  Impression: 1  Moderate to large loculated left pleural effusion  2   Compressive atelectasis throughout the left lung  No evidence of significant underlying airspace consolidation to suggest pneumonia  Workstation performed: [id number]     EKG, Pathology, and Other Studies: I have personally reviewed pertinent reports  ALLERGIES     Allergies   Allergen Reactions    Ibuprofen Hives, Shortness Of Breath and Swelling     MEDICATIONS PRIOR TO ARRIVAL     Prior to Admission medications    Medication Sig Start Date End Date Taking?  Authorizing Provider   Acetaminophen (Tylenol) 325 MG CAPS Take 650 mg by mouth every 6 (six) hours as needed 1/23/21 2/2/21  Historical Provider, MD   aspirin (ECOTRIN LOW STRENGTH) 81 mg EC tablet Take 81 mg by mouth bimonthly    Historical Provider, MD     MEDICATIONS ADMINISTERED IN LAST 24 HOURS Medication Administration - last 24 hours from 01/29/2021 2043 to 01/30/2021 2043       Date/Time Order Dose Route Action Action by     01/30/2021 1911 sodium chloride 0 9 % with KCl 20 mEq/L infusion (premix) 100 mL/hr Intravenous 12 [facility], RN     01/30/2021 1957 midazolam (VERSED) injection 0 5 mg Intravenous Given Bri Dozier RN     01/30/2021 2003 fentanyl citrate (PF) 100 MCG/2ML 25 mcg Intravenous Given Bri Dozier RN     01/30/2021 1958 fentanyl citrate (PF) 100 MCG/2ML 25 mcg Intravenous Given Bri Dozier RN     01/30/2021 2005 lidocaine 1% buffered 10 mL Infiltration Given Jesús Loza MD        CURRENT MEDICATIONS     Current Facility-Administered Medications   Medication Dose Route Frequency Provider Last Rate    acetaminophen  650 mg Oral Q6H PRN Stacie Mott MD      Or   [facility] HYDROmorphone  1 mg Intravenous Q4H PRN Stacie Mott MD      albuterol  2 puff Inhalation Q4H PRN Thom Miranda DO      docusate sodium  100 mg Oral BID PRN Stacie Mott MD      [START ON 1/31/2021] enoxaparin  40 mg Subcutaneous Q24H [facility] Thom Miranda DO      multi-electrolyte  100 mL/hr Intravenous Continuous [employer] DO      [START ON 1/31/2021] nicotine  21 mg Transdermal Daily Thom Miranda DO      ondansetron  4 mg Intravenous Q4H PRN Stacie Mott MD      piperacillin-tazobactam  3 375 g Intravenous Q6H Stacie Mott MD      sodium chloride 0 9 % with KCl 20 mEq/L  100 mL/hr Intravenous Continuous Stacie Mott  mL/hr (01/30/21 1911)    vancomycin  15 mg/kg (Adjusted) Intravenous Q8H Stacie Mott MD       multi-electrolyte, 100 mL/hr  sodium chloride 0 9 % with KCl 20 mEq/L, 100 mL/hr, Last Rate: 100 mL/hr (01/30/21 1911)          Admission Time  I spent 30 minutes admitting the patient    This involved direct patient contact where I performed a full history and physical, reviewing previous records, and reviewing laboratory and other diagnostic studies  Portions of the record may have been created with voice recognition software  Occasional wrong word or "sound a like" substitutions may have occurred due to the inherent limitations of voice recognition software    Read the chart carefully and recognize, using context, where substitutions have occurred     ==  Danilo Leo, [facility]  Internal Medicine Residency PGY-1

## 2021-01-30 NOTE — EMTALA/ACUTE CARE TRANSFER
[address] [facility] [address]  [address]  [facility] [address]  Dept: [phone]      ACUTE CARE TRANSFER CONSENT    NAME Nasrin Woods                                         1975                              MRN [id number]    I have been informed of my rights regarding examination, treatment, and transfer   by Dr Meaghan Del Castillo MD    Benefits:      Risks:        Consent for Transfer:  I acknowledge that my medical condition has been evaluated and explained to me by the treating physician or other qualified medical person and/or my attending physician, who has recommended that I be transferred to the service of    at    The above potential benefits of such transfer, the potential risks associated with such transfer, and the probable risks of not being transferred have been explained to me, and I fully understand them  The doctor has explained that, in my case, the benefits of transfer outweigh the risks  I agree to be transferred  I authorize the performance of emergency medical procedures and treatments upon me in both transit and upon arrival at the receiving facility  Additionally, I authorize the release of any and all medical records to the receiving facility and request they be transported with me, if possible  I understand that the safest mode of transportation during a medical emergency is an ambulance and that the Hospital advocates the use of this mode of transport  Risks of traveling to the receiving facility by car, including absence of medical control, life sustaining equipment, such as oxygen, and medical personnel has been explained to me and I fully understand them  (SOPHY CORRECT BOX BELOW)  [  ]  I consent to the stated transfer and to be transported by ambulance/helicopter  [  ]  I consent to the stated transfer, but refuse transportation by ambulance and accept full responsibility for my transportation by car    I understand the risks of non-ambulance transfers and I exonerate the Hospital and its staff from any deterioration in my condition that results from this refusal     X___________________________________________    DATE  21  TIME________  Signature of patient or legally responsible individual signing on patient behalf           RELATIONSHIP TO PATIENT_________________________          Provider Certification    NAME Jesu Segundo                                         1975                              MRN [id number]    A medical screening exam was performed on the above named patient  Based on the examination:    Condition Necessitating Transfer  Chest tube with thrombolytics    Patient Condition:      Reason for Transfer:      Transfer Requirements: Facility     · Space available and qualified personnel available for treatment as acknowledged by    · Agreed to accept transfer and to provide appropriate medical treatment as acknowledged by          · Appropriate medical records of the examination and treatment of the patient are provided at the time of transfer   [address] [place] [address] [address] _______  · Transfer will be performed by qualified personnel from    and appropriate transfer equipment as required, including the use of necessary and appropriate life support measures      Provider Certification: I have examined the patient and explained the following risks and benefits of being transferred/refusing transfer to the patient/family:         Based on these reasonable risks and benefits to the patient and/or the unborn child(lyndon), and based upon the information available at the time of the patients examination, I certify that the medical benefits reasonably to be expected from the provision of appropriate medical treatments at another medical facility outweigh the increasing risks, if any, to the individuals medical condition, and in the case of labor to the unborn child, from effecting the transfer      X____________________________________________ DATE 01/30/21        TIME_______      ORIGINAL - SEND TO MEDICAL RECORDS   COPY - SEND WITH PATIENT DURING TRANSFER

## 2021-01-30 NOTE — UTILIZATION REVIEW
Initial Clinical Review    Admission: Date/Time/Statement:   Admission Orders (From admission, onward)     Ordered        01/29/21 1815  Inpatient Admission  Once                   Orders Placed This Encounter   Procedures    Inpatient Admission     Standing Status:   Standing     Number of Occurrences:   1     Order Specific Question:   Level of Care     Answer:   Med Surg [16]     Order Specific Question:   Estimated length of stay     Answer:   More than 2 Midnights     Order Specific Question:   Certification     Answer:   I certify that inpatient services are medically necessary for this patient for a duration of greater than two midnights  See H&P and MD Progress Notes for additional information about the patient's course of treatment  ED Arrival Information     Expected Arrival Acuity Means of Arrival Escorted By Service Admission Type    - 1/29/2021 15:55 Emergent Walk-In Self Hospitalist Emergency    Arrival Complaint    Abdominal/chest Spasm, shortness of breath        Chief Complaint   Patient presents with    Shortness of Breath     pt c/o increased sob, body aches, nausea, fevers, cough, and muscle spasms for past week  exposure to covid positive gf  covid test negative 1/23/21  denies travel/v/d     Assessment/Plan: 40 yo male to ED from home w/ inc SOB , L sided chest discomfort and prod cough x1 week   Intermittent fever and chills  Has tested neg for COVID 1/23 , wife positive   Was tx  presumptively positive based on history, and was prescribed azithromycin, vitamin/mineral supplementation, and symptomatic treatment at [facility] ED 1/23-1/27  Sec ED visit 1/27 CT found sm L pleural effusion   tx w/ muscle relaxants  Admitted IP status w/ L pleural effusion , pneumonia   Plan for ceftriaxone and doxycyline   Hx of graves disease, has gained 30 lbs over the last year , will start levothyroxine , recheck TSH        PE : dec BS     ED Triage Vitals [01/29/21 1608]   Temperature Pulse Respirations Blood Pressure SpO2   98 2 °F (36 8 °C) (!) 129 18 (!) 180/94 94 %      Temp Source Heart Rate Source Patient Position - Orthostatic VS BP Location FiO2 (%)   Oral Monitor Lying Right arm --      Pain Score       8          Wt Readings from Last 1 Encounters:   01/30/21 111 kg (245 lb 9 6 oz)     Additional Vital Signs:   01/30/21 0742  --  --  --  --  --  91 %  None (Room air)  --   01/30/21 07:11:04  98 8 °F (37 1 °C)  106Abnormal   16  121/87  98  89 %Abnormal   --  --   01/29/21 22:24:46  --  107Abnormal   18  155/110Abnormal   125  93 %  --  --   01/29/21 1954  --  --  --  --  --  --  None (Room air)  --   01/29/21 1924  --  111Abnormal   28Abnormal   155/110Abnormal   125  94 %  None (Room air)  Lying   01/29/21 1845  --  118Abnormal   30Abnormal   148/104Abnormal   121  98 %  --  --   01/29/21 1830  --  108Abnormal   21  129/89  105  94 %  None (Room air)  --   01/29/21 1815  --  115Abnormal   18  148/95  114  95 %  None (Room air)  Lying   01/29/21 1800  --  124Abnormal   20  151/97  119  96 %  None (Room air)  --   01/29/21 1730  --  118Abnormal   20  128/96  105  91 %  None (Room air)  --   01/29/21 1645  --  121Abnormal   18  135/94  107  93 %  --  --   01/29/21 1630  --  119Abnormal   18  136/91  107  92 %  None (Room air)  Lying   01/29/21 1615  --  127Abnormal   20  164/94  125  93 %  None (Room air         Pertinent Labs/Diagnostic Test Results:   1/29 EKG ST   1/29 PCXR Moderate size left pleural effusion  No pneumothorax      1/29 CT chest    1  Moderate to large loculated left pleural effusion  2   Compressive atelectasis throughout the left lung    No evidence of significant underlying airspace consolidation to suggest pneumonia      Results from last 7 days   Lab Units 01/29/21  1722   SARS-COV-2  Negative     Results from last 7 days   Lab Units 01/30/21  0558 01/29/21  1654   WBC Thousand/uL 10 34* 13 03*   HEMOGLOBIN g/dL 12 8 14 2   HEMATOCRIT % 38 9 42 6   PLATELETS Thousands/uL 382 425* NEUTROS ABS Thousands/µL 7 70* 10 55*     Results from last 7 days   Lab Units 01/30/21  0558 01/29/21  1654   SODIUM mmol/L 133* 135*   POTASSIUM mmol/L 3 7 3 5   CHLORIDE mmol/L 101 97*   CO2 mmol/L 18* 24   ANION GAP mmol/L 14* 14*   BUN mg/dL 12 15   CREATININE mg/dL 0 88 1 16   EGFR ml/min/1 73sq m 104 76   CALCIUM mg/dL 8 3 9 3   MAGNESIUM mg/dL 1 8  --    PHOSPHORUS mg/dL 2 4*  --      Results from last 7 days   Lab Units 01/30/21  0558 01/29/21  1654   AST U/L 13 13   ALT U/L 19 24   ALK PHOS U/L 102 119*   TOTAL PROTEIN g/dL 7 0 8 5*   ALBUMIN g/dL 2 2* 3 0*   TOTAL BILIRUBIN mg/dL 0 62 0 89     Results from last 7 days   Lab Units 01/30/21  0558 01/29/21  1654   GLUCOSE RANDOM mg/dL 79 101     Results from last 7 days   Lab Units 01/29/21  1839   TROPONIN I ng/mL <0 02     Results from last 7 days   Lab Units 01/30/21  0558   PROTIME seconds 13 4   INR  1 04     Results from last 7 days   Lab Units 01/29/21  1654   TSH 3RD GENERATON uIU/mL 8 524*     Results from last 7 days   Lab Units 01/29/21  1654   LACTIC ACID mmol/L 1 1     Results from last 7 days   Lab Units 01/29/21  1654   NT-PRO BNP pg/mL 26     Results from last 7 days   Lab Units 01/29/21  1654   CRP mg/L 370 5*   SED RATE mm/hour 130*     Results from last 7 days   Lab Units 01/29/21  1722   INFLUENZA A PCR  Negative   INFLUENZA B PCR  Negative   RSV PCR  Negative     Results from last 7 days   Lab Units 01/29/21  1654   BLOOD CULTURE  Received in Microbiology Lab  Culture in Progress  Received in Microbiology Lab  Culture in Progress       ED Treatment:   Medication Administration from 01/29/2021 1555 to 01/29/2021 1937       Date/Time Order Dose Route Action     01/29/2021 1734 sodium chloride 0 9 % bolus 1,000 mL 1,000 mL Intravenous New Bag     01/29/2021 1924 morphine (PF) 4 mg/mL injection 4 mg 4 mg Intravenous Given        Past Medical History:   Diagnosis Date    Acquired hypothyroidism     DVT (deep venous thrombosis) (HCC) left leg    History of DVT (deep vein thrombosis)     Hypertension     Hyperthyroidism     Pleural effusion, left 1/29/2021    SIRS (systemic inflammatory response syndrome) ([facility])     Tobacco abuse      Present on Admission:   Pleural effusion, left   Acquired hypothyroidism   Tobacco abuse   SIRS (systemic inflammatory response syndrome) ([facility])      Admitting Diagnosis: Shortness of breath [R06 02]  Chest pain [R07 9]  Pneumonia [J18 9]  Dyspnea [R06 00]  Hypothyroidism [E03 9]  Pleural effusion [J90]  Age/Sex: 39 y o  male  Admission Orders:  Scheduled Medications:  cefTRIAXone, 1,000 mg, Intravenous, Q24H  doxycycline, 100 mg, Intravenous, Q12H  heparin (porcine), 5,000 Units, Subcutaneous, Q8H GARRET  levothyroxine, 75 mcg, Oral, Early Morning      Continuous IV Infusions:  sodium chloride 0 9 % with KCl 20 mEq/L, 100 mL/hr, Intravenous, Continuous      PRN Meds:  acetaminophen, 650 mg, Oral, Q6H PRN    Or  HYDROmorphone, 1 mg, Intravenous, Q4H PRN  aluminum-magnesium hydroxide-simethicone, 30 mL, Oral, Q6H PRN  docusate sodium, 100 mg, Oral, BID PRN  ondansetron, 4 mg, Intravenous, Q4H PRN        IP CONSULT TO CASE MANAGEMENT  IP CONSULT TO MEDICAL CRITICAL CARE  IP CONSULT TO PULMONOLOGY    Network Utilization Review Department  ATTENTION: Please call with any questions or concerns to [phone] and carefully listen to the prompts so that you are directed to the right person  All voicemails are confidential   Christy Muss all requests for admission clinical reviews, approved or denied determinations and any other requests to dedicated fax number below belonging to the campus where the patient is receiving treatment   List of dedicated fax numbers for the Facilities:  [address] DENIALS (Administrative/Medical Necessity) [phone]   [address] (Maternity/NICU/Pediatrics) [phone]   [address] [address] [facility] [address]) [address] [place] [address] [address] [phone]   [place] [address] [phone]

## 2021-01-30 NOTE — TRANSPORTATION MEDICAL NECESSITY
Section I - General Information    Name of Patient: Oswaldo Fonseca                 : 1975    Medicare #:   Transport Date: 21 (PCS is valid for round trips on this date and for all repetitive trips in the 60-day range as noted below )  Origin: [address]: Hari Vergara    Is the pt's stay covered under Medicare Part A (PPS/DRG)   []     Closest appropriate facility? If no, why is transport to more distant facility required? Yes  If hospice pt, is this transport related to pt's terminal illness? No       Section II - Medical Necessity Questionnaire  Ambulance transportation is medically necessary only if other means of transport are contraindicated or would be potentially harmful to the patient  To meet this requirement, the patient must either be "bed confined" or suffer from a condition such that transport by means other than ambulance is contraindicated by the patient's condition  The following questions must be answered by the medical professional signing below for this form to be valid:    1)  Describe the MEDICAL CONDITION (physical and/or mental) of this patient AT [address] [place] [address] that requires the patient to be transported in an ambulance and why transport by other means is contraindicated by the patient's condition:  - large lt pleural effusion    2) Is the patient "bed confined" as defined below? No  To be "be confined" the patient must satisfy all three of the following conditions: (1) unable to get up from bed without Assistance; AND (2) unable to ambulate; AND (3) unable to sit in a chair or wheelchair  3) Can this patient safely be transported by car or wheelchair van (i e , seated during transport without a medical attendant or monitoring)?    No    4) In addition to completing questions 1-3 above, please check any of the following conditions that apply*:   *Note: supporting documentation for any boxes checked must be maintained in the patient's medical records  Medical attendant required   Hemodynamic monitoring required en route     hosp-hosp transfer, describe services needed at 2nd facility not available at 1st facility? IR with thrombolytics( Chest tube requirement)    Section III - Signature of Physician or Healthcare Professional  I certify that the above information is true and correct based on my evaluation of this patient, and represent that the patient requires transport by ambulance and that other forms of transport are contraindicated  I understand that this information will be used by the Centers for Medicare and Medicaid Services (CMS) to support the determination of medical necessity for ambulance services, and I represent that I have personal knowledge of the patient's condition at time of transport  []  If this box is checked, I also certify that the patient is physically or mentally incapable of signing the ambulance service's claim and that the institution with which I am affiliated has furnished care, services, or assistance to the patient  My signature below is made on behalf of the patient pursuant to 42 CFR §424 36(b)(4)  In accordance with 42 CFR §424 37, the specific reason(s) that the patient is physically or mentally incapable of signing the claim form is as follows: Vinayak Evangelista of Physician* or Healthcare Professional______________________________________________________________  Signature Date 01/30/21 (For scheduled repetitive transports, this form is not valid for transports performed more than 60 days after this date)    Printed Name & Credentials of Physician or Healthcare Professional (MD, DO, RN, etc )__Marlene Lamar RN______________________________  *Form must be signed by patient's attending physician for scheduled, repetitive transports   For non-repetitive, unscheduled ambulance transports, if unable to obtain the signature of the attending physician, any of the following may sign (choose appropriate option below)  [] Physician Assistant []  Clinical Nurse Specialist []  Registered Nurse  []  Nurse Practitioner  [x] Discharge Planner

## 2021-01-30 NOTE — ASSESSMENT & PLAN NOTE
History most consistent with Graves disease  Patient reports history of hyperthyroidism treated with medication as a teenager, followed by hypothyroidism  Reports he was advised to take levothyroxine for supplementation, but has not taken for many years due to inconsistent medical care and lack of symptoms  Patient has no electronic documentation from prior treatment, but has some thyroid labs in Care Everywhere  Labs in [address] [place] [address] show hyperthyroidism with TSH less than 0 03 and elevated free T3/T4  TSH 22 4 at [facility] ER January 27, and 8 5 on day of presentation  Free T4 pending  Patient reports no hypothyroid symptoms recently except for weight gain of approximately 30 pounds over past year  Patient on admission was started on Synthroid 75 mcg daily TSH is 8 with normal free T4 will lower his Synthroid to 25 mcg daily    Repeat TSH in 4 weeks

## 2021-01-30 NOTE — ASSESSMENT & PLAN NOTE
Patient meets 3 of 4 SIRS criteria with leukocytosis, tachycardia, and tachypnea  Present on admission source is the loculated pleural effusion rule out abscess rule out empyema  Antibiotics vanc and Zosyn  IV fluids will be transferred for chest tube placement plus-minus thrombolytics

## 2021-01-30 NOTE — PROGRESS NOTES
Vancomycin Assessment    Gallito Reeves is a 39 y o  male who is currently receiving vancomycin 1250 mg IV every 8 hours for Pneumonia     Relevant clinical data and objective history reviewed:  Creatinine   Date Value Ref Range Status   01/30/2021 0 88 0 60 - 1 30 mg/dL Final     Comment:     Standardized to IDMS reference method   01/29/2021 1 16 0 60 - 1 30 mg/dL Final     Comment:     Standardized to IDMS reference method     /87   Pulse (!) 106   Temp 98 8 °F (37 1 °C)   Resp 16   Ht 6' (1 829 m)   Wt 111 kg (245 lb 9 6 oz)   SpO2 91%   BMI 33 31 kg/m²   I/O last 3 completed shifts: In: 1552 1 [I V :502 1; IV Piggyback:1050]  Out: -   Lab Results   Component Value Date/Time    BUN 12 01/30/2021 05:58 AM    WBC 10 34 (H) 01/30/2021 05:58 AM    HGB 12 8 01/30/2021 05:58 AM    HCT 38 9 01/30/2021 05:58 AM    MCV 93 01/30/2021 05:58 AM     01/30/2021 05:58 AM     Temp Readings from Last 3 Encounters:   01/30/21 98 8 °F (37 1 °C)     Vancomycin Days of Therapy: 1    Assessment/Plan  The patient is currently on vancomycin utilizing scheduled dosing based on adjusted body weight (due to obesity)  The patient is currently receiving 1250 mg IV every 8 hours and is clinically appropriate and dose will be continued  Pharmacy will also follow closely for s/sx of nephrotoxicity, infusion reactions, and appropriateness of therapy  BMP and CBC will be ordered per protocol  Plan for trough as patient approaches steady state, prior to the 4th  dose at approximately 1115 at 1/31/21  Due to infection severity, will target a trough of 15-20 (appropriate for most indications)   Pharmacy will continue to follow the patients culture results and clinical progress daily      Wen Streeter, Pharmacist

## 2021-01-30 NOTE — DISCHARGE SUMMARY
Discharge- Alma Mins 1975, 39 y o  male MRN: [id number]    Unit/Bed#: -01 Encounter: [id number]    Primary Care Provider: No primary care provider on file  Date and time admitted to hospital: 1/29/2021  4:00 PM        Sepsis [facility])  Assessment & Plan  Patient meets 3 of 4 SIRS criteria with leukocytosis, tachycardia, and tachypnea  Present on admission source is the loculated pleural effusion rule out abscess rule out empyema  Antibiotics vanc and Zosyn  IV fluids will be transferred for chest tube placement plus-minus thrombolytics  Tobacco abuse  Assessment & Plan  Chronic condition  Encouraged cessation; patient declines assistance at this time  Offer nicotine patch during admission  Acquired hypothyroidism  Assessment & Plan  History most consistent with Graves disease  Patient reports history of hyperthyroidism treated with medication as a teenager, followed by hypothyroidism  Reports he was advised to take levothyroxine for supplementation, but has not taken for many years due to inconsistent medical care and lack of symptoms  Patient has no electronic documentation from prior treatment, but has some thyroid labs in Care Everywhere  Labs in 0 show hyperthyroidism with TSH less than 0 03 and elevated free T3/T4  TSH 22 4 at [place] ER January 27, and 8 5 on day of presentation  Free T4 pending  Patient reports no hypothyroid symptoms recently except for weight gain of approximately 30 pounds over past year  Patient on admission was started on Synthroid 75 mcg daily TSH is 8 with normal free T4 will lower his Synthroid to 25 mcg daily  Repeat TSH in 4 weeks    * Pleural effusion, left  Assessment & Plan  Left pleural effusion is loculated- I am unable to rule out an empyema  Patient does have symptoms of a chills productive cough diaphoresis he absolutely had no trauma that he tells me  He never had this before    He has pleuritic chest pain he can even take a breath  Discussed with critical care team he will need a chest tube that is unable to be done here  I discussed with interventional radiology for chest tube placement with the thrombolytics- patient will need to be transferred to [facility] as the services are not available here  - discussed with IR Dr Alfredo Oswald- patient placed npo for today procedure  Down the road can be decided if CT surgery needs to be involved as well  With consult Pulmonary as well when he is at the [facility] will switch antibiotics to Zosyn and vancomycin  I discussed all with the patient and his significant other they provided understanding  Pleural studies have been ordered on discharge    Patient presenting with rapidly developing left pleural effusion over past week  Prior imaging not available, but based on reports, effusion was not visible on x-rays and only described as "small" on CT January 27  Described as "moderate to large" by radiologist at time of presentation, but that appears understated based on review of imaging  Nonetheless, patient compensating well at this time, maintaining low-normal oxygen saturation on room air, with mild tachypnea and tachycardia  Will admit to med/surg floor and consult critical care for diagnostic/therapeutic thoracentesis  No evidence of PE either            Discharging Physician / Practitioner: Hunter Leiva MD  PCP: No primary care provider on file  Admission Date:   Admission Orders (From admission, onward)     Ordered        01/29/21 1815  Inpatient Admission  Once                   Discharge Date: 01/30/21    Resolved Problems  Date Reviewed: 1/30/2021    None          Consultations During Hospital Stay:  · Pulmonary via tiger text   · IR via tiger text  · CC service    Procedures Performed:   · none    Significant Findings / Test Results:   · Ct chest -Moderate to large loculated left pleural effusion  2   Compressive atelectasis throughout the left lung    No evidence of significant underlying airspace consolidation to suggest pneumonia  Incidental Findings:   · none     Test Results Pending at Discharge (will require follow up):   · none     Outpatient Tests Requested:  · Per [facility]    Complications:  none    Reason for Admission:  Shortness of breath    Hospital Course:     Alma Sanchez is a 39 y o  male patient who originally presented to the hospital on 1/29/2021 due to increasing shortness of breath for 1 week left-sided chest discomfort associated chills productive cough and diaphoresis  His been to previous ERs twice in was a small effusion negative for PE and he was sent home  Now he has a moderate to large loculated effusion concern is for complicated rule out abscess rule out empyema  Patient is quite ill  He presented with sepsis antibiotics were changed to vanc and Zosyn prior to discharge he has white count  I discussed with critical care team the most appropriate approach is a chest tube which cannot be done here of that caliber and thrombolytics  He would need IR and for that reason he is going to be transferred to [facility] and may be down the road if these measures will not work would need involvement of cardiothoracic surgery  I discussed with IR Dr Lavern Osuna- will be doing the procedure  Npo has been placed  Patient accepted by SOD service of Dr Suzi Mcgraw        Please see above list of diagnoses and related plan for additional information       Condition at Discharge: stable     Discharge Day Visit / Exam:     Subjective:  Patient seen and examined still complaining of shortness of breath and pleuritic pain he can not even turn around from the pain  Vitals: Blood Pressure: 121/87 (01/30/21 0711)  Pulse: (!) 106 (01/30/21 0711)  Temperature: 98 8 °F (37 1 °C) (01/30/21 0711)  Temp Source: Oral (01/29/21 1924)  Respirations: 16 (01/30/21 0711)  Height: 6' (182 9 cm) (01/29/21 1608)  Weight - Scale: 111 kg (245 lb 9 6 oz) (01/30/21 0600)  SpO2: 91 % (01/30/21 1704)  Exam:   Physical Exam  Constitutional:       Appearance: He is ill-appearing  HENT:      Head: Normocephalic and atraumatic  Eyes:      Extraocular Movements: Extraocular movements intact  Pupils: Pupils are equal, round, and reactive to light  Cardiovascular:      Rate and Rhythm: Tachycardia present  Pulmonary:      Effort: Pulmonary effort is normal       Breath sounds: Rales (left crackles) present  Abdominal:      General: Abdomen is flat  Bowel sounds are normal  There is no distension  Palpations: Abdomen is soft  Musculoskeletal:         General: No swelling  Neurological:      General: No focal deficit present  Mental Status: He is oriented to person, place, and time  Psychiatric:         Mood and Affect: Mood normal          Discussion with Family: significant other    Discharge instructions/Information to patient and family:   See after visit summary for information provided to patient and family  Provisions for Follow-Up Care:  See after visit summary for information related to follow-up care and any pertinent home health orders  Disposition:     [address] Transfer to \[facility]\""    For Discharges to [facility]:   · Not Applicable to this Patient - Not Applicable to this Patient    Planned Readmission: no     Discharge Statement:  I spent >35 minutes discharging the patient  This time was spent on the day of discharge  I had direct contact with the patient on the day of discharge  Greater than 50% of the total time was spent examining patient, answering all patient questions, arranging and discussing plan of care with patient as well as directly providing post-discharge instructions  Additional time then spent on discharge activities  Discharge Medications:  See after visit summary for reconciled discharge medications provided to patient and family        ** Please Note: This note has been constructed using a voice recognition system **

## 2021-01-30 NOTE — PLAN OF CARE
Problem: PAIN - ADULT  Goal: Verbalizes/displays adequate comfort level or baseline comfort level  Description: Interventions:  - Encourage patient to monitor pain and request assistance  - Assess pain using appropriate pain scale  - Administer analgesics based on type and severity of pain and evaluate response  - Implement non-pharmacological measures as appropriate and evaluate response  - Consider cultural and social influences on pain and pain management  - Notify physician/advanced practitioner if interventions unsuccessful or patient reports new pain  Outcome: Progressing     Problem: INFECTION - ADULT  Goal: Absence or prevention of progression during hospitalization  Description: INTERVENTIONS:  - Assess and monitor for signs and symptoms of infection  - Monitor lab/diagnostic results  - Monitor all insertion sites, i e  indwelling lines, tubes, and drains  - Monitor endotracheal if appropriate and nasal secretions for changes in amount and color  - [place] appropriate cooling/warming therapies per order  - Administer medications as ordered  - Instruct and encourage patient and family to use good hand hygiene technique  - Identify and instruct in appropriate isolation precautions for identified infection/condition  Outcome: Progressing     Problem: SAFETY ADULT  Goal: Patient will remain free of falls  Description: INTERVENTIONS:  - Assess patient frequently for physical needs  -  Identify cognitive and physical deficits and behaviors that affect risk of falls    -  [place] fall precautions as indicated by assessment   - Educate patient/family on patient safety including physical limitations  - Instruct patient to call for assistance with activity based on assessment  - Modify environment to reduce risk of injury  - Consider OT/PT consult to assist with strengthening/mobility  Outcome: Progressing  Goal: Maintain or return to baseline ADL function  Description: INTERVENTIONS:  -  Assess patient's ability to carry out ADLs; assess patient's baseline for ADL function and identify physical deficits which impact ability to perform ADLs (bathing, care of mouth/teeth, toileting, grooming, dressing, etc )  - Assess/evaluate cause of self-care deficits   - Assess range of motion  - Assess patient's mobility; develop plan if impaired  - Assess patient's need for assistive devices and provide as appropriate  - Encourage maximum independence but intervene and supervise when necessary  - Involve family in performance of ADLs  - Assess for home care needs following discharge   - Consider OT consult to assist with ADL evaluation and planning for discharge  - Provide patient education as appropriate  Outcome: Progressing  Goal: Maintain or return mobility status to optimal level  Description: INTERVENTIONS:  - Assess patient's baseline mobility status (ambulation, transfers, stairs, etc )    - Identify cognitive and physical deficits and behaviors that affect mobility  - Identify mobility aids required to assist with transfers and/or ambulation (gait belt, sit-to-stand, lift, walker, cane, etc )  - [place] fall precautions as indicated by assessment  - Record patient progress and toleration of activity level on Mobility SBAR; progress patient to next Phase/Stage  - Instruct patient to call for assistance with activity based on assessment  - Consider rehabilitation consult to assist with strengthening/weightbearing, etc   Outcome: Progressing     Problem: DISCHARGE PLANNING  Goal: Discharge to home or other facility with appropriate resources  Description: INTERVENTIONS:  - Identify barriers to discharge w/patient and caregiver  - Arrange for needed discharge resources and transportation as appropriate  - Identify discharge learning needs (meds, wound care, etc )  - Arrange for interpretive services to assist at discharge as needed  - Refer to Case Management Department for coordinating discharge planning if the patient needs post-hospital services based on physician/advanced practitioner order or complex needs related to functional status, cognitive ability, or social support system  Outcome: Progressing     Problem: Knowledge Deficit  Goal: Patient/family/caregiver demonstrates understanding of disease process, treatment plan, medications, and discharge instructions  Description: Complete learning assessment and assess knowledge base    Interventions:  - Provide teaching at level of understanding  - Provide teaching via preferred learning methods  Outcome: Progressing

## 2021-01-30 NOTE — ASSESSMENT & PLAN NOTE
Chronic condition  Encouraged cessation; patient declines assistance at this time  Offer nicotine patch during admission

## 2021-01-30 NOTE — ASSESSMENT & PLAN NOTE
Left pleural effusion is loculated- I am unable to rule out an empyema  Patient does have symptoms of a chills productive cough diaphoresis he absolutely had no trauma that he tells me  He never had this before  He has pleuritic chest pain he can even take a breath  Discussed with critical care team he will need a chest tube that is unable to be done here  I discussed with interventional radiology for chest tube placement with the thrombolytics- patient will need to be transferred to [facility] as the services are not available here  - discussed with IR Dr Estelle Carcamo- patient placed npo for today procedure  Down the road can be decided if CT surgery needs to be involved as well  With consult Pulmonary as well when he is at the [facility] will switch antibiotics to Zosyn and vancomycin  I discussed all with the patient and his significant other they provided understanding  Pleural studies have been ordered on discharge    Patient presenting with rapidly developing left pleural effusion over past week  Prior imaging not available, but based on reports, effusion was not visible on x-rays and only described as "small" on CT January 27  Described as "moderate to large" by radiologist at time of presentation, but that appears understated based on review of imaging  Nonetheless, patient compensating well at this time, maintaining low-normal oxygen saturation on room air, with mild tachypnea and tachycardia  Will admit to med/surg floor and consult critical care for diagnostic/therapeutic thoracentesis    No evidence of PE either

## 2021-01-30 NOTE — ED PROVIDER NOTES
History  Chief Complaint   Patient presents with    Shortness of Breath     pt c/o increased sob, body aches, nausea, fevers, cough, and muscle spasms for past week  exposure to covid positive gf  covid test negative 1/23/21  denies travel/v/d     Patient is a 49-year-old male presents emergency room chief complaint of left-sided chest pain and shortness of breath  Patient reports to me that his significant other was recently diagnosed with COVID-19  He reports to me that he had been seen at another facility with the same complaint as he is presenting for today but today he reports that the symptoms are getting worse  He describes a sharp pain on left side of his chest that radiates up into his left scapula and to the top of his left shoulder  He reports that it does radiate somewhat down into his abdomen  He reports that the pain is worse with deep inspiration  He has had a mild cough and subjective fever  He denies any nausea vomiting  He reports that the pain is sharp and intermittent  He reports that is worse with movement and deep inspirations  He denies any blunt trauma to the area  Currently in the emergency room he appears somewhat uncomfortable and slightly tachypneic  He is also tachycardic with heart rate in the 120s  History provided by:  Patient  Shortness of Breath  Severity:  Moderate  Onset quality:  Gradual  Timing:  Constant  Progression:  Worsening  Chronicity:  New  Context: activity    Relieved by:  Nothing  Worsened by:  Deep breathing, exertion, movement, coughing and activity  Ineffective treatments:  None tried  Associated symptoms: chest pain, cough and fever    Associated symptoms: no sore throat and no vomiting    Chest pain:     Quality: aching, radiating (Radiates to left shoulder and left scapula) and sharp    Cough:     Cough characteristics:  Unable to specify      Prior to Admission Medications   Prescriptions Last Dose Informant Patient Reported? Taking? Acetaminophen (Tylenol) 325 MG CAPS   Yes Yes   Sig: Take 650 mg by mouth every 6 (six) hours as needed   aspirin (ECOTRIN LOW STRENGTH) 81 mg EC tablet  Self Yes Yes   Sig: Take 81 mg by mouth bimonthly      Facility-Administered Medications: None       Past Medical History:   Diagnosis Date    DVT (deep venous thrombosis) (HCC) left leg    Hypertension     Hyperthyroidism        Past Surgical History:   Procedure Laterality Date    FRACTURE SURGERY         History reviewed  No pertinent family history  I have reviewed and agree with the history as documented  E-Cigarette/Vaping    E-Cigarette Use Never User      E-Cigarette/Vaping Substances     Social History     Tobacco Use    Smoking status: Current Every Day Smoker    Smokeless tobacco: Never Used   Substance Use Topics    Alcohol use: Not Currently    Drug use: Yes     Types: Marijuana       Review of Systems   Constitutional: Positive for activity change, appetite change, chills, fatigue (Subjective) and fever  HENT: Negative for congestion, sinus pressure, sore throat and trouble swallowing  Eyes: Negative  Respiratory: Positive for cough, chest tightness and shortness of breath  Negative for stridor  Cardiovascular: Positive for chest pain  Negative for palpitations and leg swelling  Gastrointestinal: Positive for nausea  Negative for abdominal distention, constipation, diarrhea and vomiting  Endocrine: Negative  Genitourinary: Negative  Musculoskeletal: Positive for arthralgias and myalgias  Allergic/Immunologic: Negative  Neurological: Negative  Negative for dizziness, weakness and numbness  Psychiatric/Behavioral: Negative  All other systems reviewed and are negative  Physical Exam  Physical Exam  Vitals signs and nursing note reviewed  Constitutional:       General: He is in acute distress  Appearance: Normal appearance  He is well-developed  He is ill-appearing  He is not toxic-appearing  HENT:      Head: Normocephalic and atraumatic  Nose: Nose normal       Mouth/Throat:      Mouth: Mucous membranes are moist       Pharynx: Oropharynx is clear  Eyes:      Pupils: Pupils are equal, round, and reactive to light  Neck:      Musculoskeletal: Normal range of motion and neck supple  Cardiovascular:      Rate and Rhythm: Normal rate and regular rhythm  Heart sounds: Normal heart sounds  No murmur  Pulmonary:      Effort: Pulmonary effort is normal  Tachypnea present  No respiratory distress  Breath sounds: No stridor  Examination of the left-middle field reveals decreased breath sounds and rhonchi  Examination of the left-lower field reveals decreased breath sounds and rhonchi  Decreased breath sounds and rhonchi present  No wheezing or rales  Abdominal:      Palpations: Abdomen is soft  Abdomen is not rigid  There is no hepatomegaly, splenomegaly or mass  Tenderness: There is no abdominal tenderness  There is no guarding or rebound  Hernia: No hernia is present  Musculoskeletal: Normal range of motion  Right lower leg: He exhibits no tenderness  No edema  Left lower leg: He exhibits no tenderness  No edema  Lymphadenopathy:      Cervical: No cervical adenopathy  Skin:     General: Skin is warm and dry  Coloration: Skin is not pale  Findings: No rash  Neurological:      Mental Status: He is alert and oriented to person, place, and time  Psychiatric:         Mood and Affect: Mood is anxious           Vital Signs  ED Triage Vitals [01/29/21 1608]   Temperature Pulse Respirations Blood Pressure SpO2   98 2 °F (36 8 °C) (!) 129 18 (!) 180/94 94 %      Temp Source Heart Rate Source Patient Position - Orthostatic VS BP Location FiO2 (%)   Oral Monitor Lying Right arm --      Pain Score       8           Vitals:    01/29/21 1800 01/29/21 1815 01/29/21 1830 01/29/21 1845   BP: 151/97 148/95 129/89 (!) 148/104   Pulse: (!) 124 (!) 115 (!) 108 (!) 118 Patient Position - Orthostatic VS:  Lying           Visual Acuity      ED Medications  Medications   sodium chloride 0 9 % infusion (has no administration in time range)   morphine (PF) 4 mg/mL injection 4 mg (4 mg Intravenous Given 1/29/21 1924)   iohexol (OMNIPAQUE) 350 MG/ML injection (SINGLE-DOSE) 100 mL (85 mL Intravenous Given 1/29/21 1917)       Diagnostic Studies  Results Reviewed     Procedure Component Value Units Date/Time    Troponin I [[id number]]  (Normal) Collected: 01/29/21 1839    Lab Status: Final result Specimen: Blood from Arm, Right Updated: 01/29/21 1907     Troponin I <0 02 ng/mL     C-reactive protein [[id number]] Collected: 01/29/21 1654    Lab Status: In process Specimen: Blood from Arm, Left Updated: 01/29/21 1900    Procalcitonin with AM Reflex [[id number]] Collected: 01/29/21 1839    Lab Status: In process Specimen: Blood from Arm, Right Updated: 01/29/21 1843    Rapid HIV 1/2 AB-AG Combo [[id number]] Collected: 01/29/21 1839    Lab Status: In process Specimen: Blood from Arm, Right Updated: 01/29/21 1843    Sedimentation rate, automated [[id number]]  (Abnormal) Collected: 01/29/21 1654    Lab Status: Final result Specimen: Blood from Arm, Left Updated: 01/29/21 1831     Sed Rate 130 mm/hour     Narrative:      New method- Test performed using  automated Rheology Technology  If following a patient's inflammatory disease during treatment, a new baseline should be established  COVID19, Influenza A/B, RSV PCR, [facility] [[id number]]  (Normal) Collected: 01/29/21 1722    Lab Status: Final result Specimen: Nares from Nose Updated: 01/29/21 1813     SARS-CoV-2 Negative     INFLUENZA A PCR Negative     INFLUENZA B PCR Negative     RSV PCR Negative    Narrative: This test has been authorized by FDA under an EUA (Emergency Use Assay) for use by authorized laboratories    Clinical caution and judgement should be used with the interpretation of these results with consideration of the clinical impression and other laboratory testing  Testing reported as "Positive" or "Negative" has been proven to be accurate according to standard laboratory validation requirements  All testing is performed with control materials showing appropriate reactivity at standard intervals  TSH [[id number]]  (Abnormal) Collected: 01/29/21 1654    Lab Status: Final result Specimen: Blood from Arm, Left Updated: 01/29/21 1727     TSH 3RD GENERATON 8 524 uIU/mL     Narrative:      Patients undergoing fluorescein dye angiography may retain small amounts of fluorescein in the body for 48-72 hours post procedure  Samples containing fluorescein can produce falsely depressed TSH values  If the patient had this procedure,a specimen should be resubmitted post fluorescein clearance  NT-BNP PRO [[id number]]  (Normal) Collected: 01/29/21 1654    Lab Status: Final result Specimen: Blood from Arm, Left Updated: 01/29/21 1725     NT-proBNP 26 pg/mL     Lactic acid [[id number]]  (Normal) Collected: 01/29/21 1654    Lab Status: Final result Specimen: Blood from Arm, Left Updated: 01/29/21 1724     LACTIC ACID 1 1 mmol/L     Narrative:      Result may be elevated if tourniquet was used during collection      Comprehensive metabolic panel [[id number]]  (Abnormal) Collected: 01/29/21 1654    Lab Status: Final result Specimen: Blood from Arm, Left Updated: 01/29/21 1719     Sodium 135 mmol/L      Potassium 3 5 mmol/L      Chloride 97 mmol/L      CO2 24 mmol/L      ANION GAP 14 mmol/L      BUN 15 mg/dL      Creatinine 1 16 mg/dL      Glucose 101 mg/dL      Calcium 9 3 mg/dL      Corrected Calcium 10 1 mg/dL      AST 13 U/L      ALT 24 U/L      Alkaline Phosphatase 119 U/L      Total Protein 8 5 g/dL      Albumin 3 0 g/dL      Total Bilirubin 0 89 mg/dL      eGFR 76 ml/min/1 73sq m     Narrative:      [place] guidelines for Chronic Kidney Disease (CKD):     Stage 1 with normal or high GFR (GFR > 90 mL/min/1 73 square meters)    Stage 2 Mild CKD (GFR = 60-89 mL/min/1 73 square meters)    Stage 3A Moderate CKD (GFR = 45-59 mL/min/1 73 square meters)    Stage 3B Moderate CKD (GFR = 30-44 mL/min/1 73 square meters)    Stage 4 Severe CKD (GFR = 15-29 mL/min/1 73 square meters)    Stage 5 End Stage CKD (GFR <15 mL/min/1 73 square meters)  Note: GFR calculation is accurate only with a steady state creatinine    CBC and differential [[id number]]  (Abnormal) Collected: 01/29/21 1654    Lab Status: Final result Specimen: Blood from Arm, Left Updated: 01/29/21 1703     WBC 13 03 Thousand/uL      RBC 4 62 Million/uL      Hemoglobin 14 2 g/dL      Hematocrit 42 6 %      MCV 92 fL      MCH 30 7 pg      MCHC 33 3 g/dL      RDW 13 1 %      MPV 9 8 fL      Platelets 087 Thousands/uL      nRBC 0 /100 WBCs      Neutrophils Relative 81 %      Immat GRANS % 0 %      Lymphocytes Relative 8 %      Monocytes Relative 9 %      Eosinophils Relative 2 %      Basophils Relative 0 %      Neutrophils Absolute 10 55 Thousands/µL      Immature Grans Absolute 0 04 Thousand/uL      Lymphocytes Absolute 1 10 Thousands/µL      Monocytes Absolute 1 13 Thousand/µL      Eosinophils Absolute 0 19 Thousand/µL      Basophils Absolute 0 02 Thousands/µL     Blood culture #1 [[id number]] Collected: 01/29/21 1654    Lab Status: In process Specimen: Blood from Arm, Left Updated: 01/29/21 1701    Blood culture #2 [[id number]] Collected: 01/29/21 1654    Lab Status: In process Specimen: Blood from Arm, Right Updated: 01/29/21 1701    UA (URINE) with reflex to Scope [[id number]]     Lab Status: No result Specimen: Urine     Rapid drug screen, urine [[id number]]     Lab Status: No result Specimen: Urine                  XR chest 1 view portable   Final Result by Nohemy Echeverria DO (01/29 1740)      Moderate size left pleural effusion  No pneumothorax                    Workstation performed: [id number]         CT chest with contrast    (Results Pending) Procedures  ECG 12 Lead Documentation Only    Date/Time: 1/29/2021 7:24 PM  Performed by: Kathie Oneil DO  Authorized by: Kathie Oneil DO     Indications / Diagnosis:  Chest pain  ECG reviewed by me, the ED Provider: yes    Patient location:  ED  Previous ECG:     Previous ECG:  Unavailable    Comparison to cardiac monitor: Yes    Interpretation:     Interpretation: abnormal    Rate:     ECG rate assessment: tachycardic    Rhythm:     Rhythm: sinus tachycardia    Ectopy:     Ectopy: none    QRS:     QRS axis:  Normal  Conduction:     Conduction: abnormal      Abnormal conduction: incomplete LBBB    ST segments:     ST segments:  Non-specific  T waves:     T waves: non-specific               ED Course  ED Course as of Jan 29 1928 Fri Jan 29, 2021   1748 WBC(!): 13 03   1855 SARS-COV-2: Negative                                           MDM  Number of Diagnoses or Management Options  Chest pain: new and requires workup  Dyspnea: new and requires workup  Hypothyroidism: new and requires workup  Pleural effusion: new and requires workup  Pneumonia: new and requires workup  Diagnosis management comments: Patient had presented to the emergency room with several days worth of discomfort on the left side of his chest and worsening shortness of breath for which he had been seen in another facility  Does records were reviewed  Patient had been started on antibiotic therapy  There was concern of a small left pleural effusion  Patient today has a moderate-sized pleural effusion  He has been hypoxic with a pulse ox of 91% he has been tachypneic and tachycardic  COVID testing was negative  Patient was started on antibiotic therapy  Patient was given fluids  Patient was medicated for pain  Also of note is that the patient has a history of hyperthyroidism    He reports that this has been treated as a child with iodine and that he was supposed to have been taking thyroid supplement which he has not been doing secondary to financial constraints  As consequence of this financial noncompliance, he presents today with an elevated TSH  Findings were all discussed with the patient  Patient was referred to [address] [place], [place] [address] for admission    Patient presented to the emergency department and a MSE was performed  The patient was evaluated and diagnosed with acute dyspnea with hypoxia with associated chest pain  Patient was found to have moderate left-sided pleural effusion and elevated white blood cell count  Concern for infectious process  Also found to have uncontrolled, not treated hypothyroidism  This is a new issue that will require additional planned work-up and treatment in a hospitalized setting  As may have been required as part of this evaluation, clinical laboratory test, radiology imaging and medical testing (I e  EKG) were ordered as necessitated by the patient's presentation  I independently reviewed these studies, imaging and testing  This patient's case is considered to be a considerable risk secondary to the above listed disease process and poses a threat to the patient's well-being and baseline function  Further in-patient diagnostic testing and management, which may include the administration of parenteral medications, is required  Patient presented to the ED and was found to be critically ill as demonstrated by the clinical history and primary physical evaluation  Pt had demonstrative findings and derangements of vital signs indicative for severe illness  I personally performed bedside history and evaluation  Interventions to address these clinical needs were ordered/performed  These included, but not necessarily limited to, the ordering and subsequent review of lab studies, imaging and EKG  Please see chart with regards to specific resuscitative interventions       Due to a high probability of clinically significant, life threatening deterioration, the patient required my highest level of care, intervention and attention  I personally spent the documented time directly managing the patient  The critical care time included obtaining a history, examining the patient, ordering and review of studies, arranging urgent treatment with development of a management plan, evaluation of patient's response to treatment, reassessment, and, if warranted, discussions with other providers or consultants  Documentation to the medical record for continuity of care was also required  Patients records pertinent to the emergent presenting condition were reviewed as available  Family was updated as available and appropriate  This critical care time was performed to assess and manage the high probability of imminent, life-threatening deterioration that could result in multi-organ failure if not addressed  It was exclusive of separately billable procedures and treating other patients and teaching time  Please see MDM section and the rest of the note for further information on patient assessment, reassessment, interventions and treatment  Total time was 32 mins exclusive of separate billable procedures             Amount and/or Complexity of Data Reviewed  Clinical lab tests: ordered and reviewed  Tests in the radiology section of CPT®: ordered and reviewed  Discuss the patient with other providers: yes    Risk of Complications, Morbidity, and/or Mortality  Presenting problems: high  Diagnostic procedures: moderate  Management options: moderate        Disposition  Final diagnoses:   Chest pain   Dyspnea   Pleural effusion   Pneumonia   Hypothyroidism     Time reflects when diagnosis was documented in both MDM as applicable and the Disposition within this note     Time User Action Codes Description Comment    1/29/2021  6:14 PM Yefri Montez Add [R07 9] Chest pain     1/29/2021  6:14 PM Yefri Montez Add [R06 00] Dyspnea     1/29/2021  6:14 PM Yefri Bautista [J90] Pleural effusion     1/29/2021  6:14 PM Stefanie Rubio Comas [J18 9] Pneumonia     1/29/2021  6:14 PM Alma Mcghee Michele [E03 9] Hypothyroidism       ED Disposition     ED Disposition Condition Date/Time Comment    Admit Stable Fri Jan 29, 2021  6:14 PM         Follow-up Information    None         Patient's Medications   Discharge Prescriptions    No medications on file     No discharge procedures on file      PDMP Review     None          ED Provider  Electronically Signed by           Candida Engle DO  01/29/21 1928

## 2021-01-30 NOTE — H&P
H&P Exam - Tamica Mejia 39 y o  male MRN: [id number]    Unit/Bed#: -01 Encounter: [id number]      Assessment/Plan       * Pleural effusion, left  Assessment & Plan  Patient presenting with rapidly developing left pleural effusion over past week  Prior imaging not available, but based on reports, effusion was not visible on x-rays and only described as "small" on CT January 27  Described as "moderate to large" by radiologist at time of presentation, but that appears understated based on review of imaging  Nonetheless, patient compensating well at this time, maintaining low-normal oxygen saturation on room air, with mild tachypnea and tachycardia  Will admit to med/surg floor and consult critical care for diagnostic/therapeutic thoracentesis  Etiology for pleural effusion unclear at time of presentation  Most likely etiologies include 1) untreated hypothyroidism - see associated diagnosis for assessment/plan, 2) pneumonia - patient reports subjective fever/chills, but none measured at this time  COVID-19 PCR x2 negative  Completed 5-day course of azithromycin  Nonetheless, will treat empirically with ceftriaxone and doxycycline pending further evaluation  SIRS (systemic inflammatory response syndrome) (HCC)  Assessment & Plan  Patient meets 3 of 4 SIRS criteria with leukocytosis, tachycardia, and tachypnea  Unclear at this time whether SIRS is secondary to infectious etiology or reaction to large left pleural effusion  Will empirically treat with ceftriaxone and doxycycline pending clinical course and further evaluation  Tobacco abuse  Assessment & Plan  Chronic condition  Encouraged cessation; patient declines assistance at this time  Offer nicotine patch during admission  Acquired hypothyroidism  Assessment & Plan  History most consistent with Graves disease  Patient reports history of hyperthyroidism treated with medication as a teenager, followed by hypothyroidism    Reports he was advised to take levothyroxine for supplementation, but has not taken for many years due to inconsistent medical care and lack of symptoms  Patient has no electronic documentation from prior treatment, but has some thyroid labs in Care Everywhere  Labs in [address] [place] [address] show hyperthyroidism with TSH less than 0 03 and elevated free T3/T4  TSH 22 4 at [facility] ER January 27, and 8 5 on day of presentation  Free T4 pending  Patient reports no hypothyroid symptoms recently except for weight gain of approximately 30 pounds over past year  Will start levothyroxine 75 mcg daily  Recommend recheck TSH no earlier than 4 weeks and adjust dose accordingly  History of Present Illness   HPI:  Diana Alan is a 39 y o  male who presents with increasing shortness of breath, left-sided chest discomfort, and minimally productive cough x1 week  Patient also reports intermittent subjective fever/chills  He was in usual state of good health until onset of symptoms January 22  He suspected COVID-19 infection, as his wife tested positive several days earlier  He went to the ER at [facility] on January 23 and 27  COVID-19 PCR negative January 23  Nonetheless, he was treated as presumptively positive based on history, and was prescribed azithromycin, vitamin/mineral supplementation, and symptomatic treatment  At second ER visit January 27, CT chest was performed, which showed no PE but did show a "small left pleural effusion "  Patient was treated symptomatically for chest wall pain with muscle relaxants  PCP: No primary care provider on file  Review of Systems   All other systems reviewed and are negative        Historical Information   Past Medical History:   Diagnosis Date    Acquired hypothyroidism     DVT (deep venous thrombosis) (HCC) left leg    History of DVT (deep vein thrombosis)     Hypertension     Hyperthyroidism     Pleural effusion, left 1/29/2021    [facility]     Tobacco abuse      Past Surgical History:   Procedure Laterality Date    FRACTURE SURGERY       Social History   Social History     Substance and Sexual Activity   Alcohol Use Not Currently     Social History     Substance and Sexual Activity   Drug Use Yes    Types: Marijuana     Social History     Tobacco Use   Smoking Status Current Every Day Smoker   Smokeless Tobacco Never Used     E-Cigarette/Vaping    E-Cigarette Use Never User       E-Cigarette/Vaping Substances     Family History: History reviewed  No pertinent family history  Meds/Allergies   all medications and allergies reviewed  Allergies   Allergen Reactions    Ibuprofen Hives, Shortness Of Breath and Swelling       Objective   Vitals: Blood pressure (!) 155/110, pulse (!) 111, temperature 98 2 °F (36 8 °C), temperature source Oral, resp  rate (!) 28, height 6' (1 829 m), weight 112 kg (246 lb), SpO2 94 %  Intake/Output Summary (Last 24 hours) at 1/29/2021 2142  Last data filed at 1/29/2021 1734  Gross per 24 hour   Intake 1000 ml   Output --   Net 1000 ml       Invasive Devices     Peripheral Intravenous Line            Peripheral IV 01/29/21 Right Antecubital less than 1 day                Physical Exam  Vitals signs and nursing note reviewed  Constitutional:       General: He is not in acute distress  Appearance: He is well-developed  He is obese  He is not ill-appearing, toxic-appearing or diaphoretic  Comments: Patient lying in hospital bed  Alert, able to converse approximately 1 sentence between breaths  Appears moderately uncomfortable, but no significant distress  HENT:      Head: Normocephalic and atraumatic  Right Ear: External ear normal       Left Ear: External ear normal       Nose: Nose normal  No congestion or rhinorrhea  Mouth/Throat:      Mouth: Mucous membranes are moist       Pharynx: Oropharynx is clear  No oropharyngeal exudate or posterior oropharyngeal erythema     Eyes: General: No scleral icterus  Right eye: No discharge  Left eye: No discharge  Conjunctiva/sclera: Conjunctivae normal       Pupils: Pupils are equal, round, and reactive to light  Neck:      Musculoskeletal: Neck supple  No muscular tenderness  Thyroid: No thyromegaly  Vascular: No JVD  Trachea: No tracheal deviation  Cardiovascular:      Rate and Rhythm: Regular rhythm  Tachycardia present  Pulses: Normal pulses  Heart sounds: Normal heart sounds  No murmur  No friction rub  No gallop  Pulmonary:      Effort: Pulmonary effort is normal  No accessory muscle usage or respiratory distress  Breath sounds: No stridor  Examination of the left-middle field reveals decreased breath sounds  Examination of the left-lower field reveals decreased breath sounds  Decreased breath sounds present  No wheezing, rhonchi or rales  Chest:      Chest wall: No tenderness  Abdominal:      General: Bowel sounds are normal  There is no distension  Palpations: Abdomen is soft  There is no mass  Tenderness: There is no abdominal tenderness  There is no right CVA tenderness, left CVA tenderness, guarding or rebound  Musculoskeletal:         General: No swelling, tenderness, deformity or signs of injury  Right lower leg: No edema  Left lower leg: No edema  Lymphadenopathy:      Cervical: No cervical adenopathy  Skin:     General: Skin is warm and dry  Capillary Refill: Capillary refill takes less than 2 seconds  Coloration: Skin is not jaundiced or pale  Findings: No bruising, erythema, lesion or rash  Neurological:      General: No focal deficit present  Mental Status: He is alert and oriented to person, place, and time  GCS: GCS eye subscore is 4  GCS verbal subscore is 5  GCS motor subscore is 6  Cranial Nerves: No dysarthria or facial asymmetry  Sensory: No sensory deficit        Motor: No weakness, tremor, atrophy or abnormal muscle tone  Psychiatric:         Attention and Perception: Attention and perception normal          Mood and Affect: Mood and affect normal          Speech: Speech normal          Behavior: Behavior normal          Thought Content: Thought content normal          Cognition and Memory: Cognition and memory normal          Judgment: Judgment normal        Lab Results: I have personally reviewed pertinent reports  Imaging: I have personally reviewed pertinent reports  and I have personally reviewed pertinent films in PACS  EKG, Pathology, and Other Studies: I have personally reviewed pertinent reports  Code Status: Level 1 - Full Code  Advance Directive and Living Will:      Power of :    POLST:      Counseling / Coordination of Care  Total floor / unit time spent today 45 minutes  Greater than 50% of total time was spent with the patient and / or family counseling and / or coordination of care  A description of the counseling / coordination of care:  Greater than 25 minutes spent with this patient discussing diagnosis, prognosis, and plan of care

## 2021-01-30 NOTE — CASE MANAGEMENT
Provider informed me that patient needs to go to higher level of care  Patient with large pleural effusion needs IR with a chest tube with thrombolytics, services are not available at this facility  Medical Necessity for transportation was completed  Copy available for transport team and a copy was placed in bin to be scanned into the chart

## 2021-01-31 ENCOUNTER — APPOINTMENT (INPATIENT)
Dept: RADIOLOGY | Facility: HOSPITAL | Age: 46
DRG: 720 | End: 2021-01-31
Payer: COMMERCIAL

## 2021-01-31 LAB
ANION GAP SERPL CALCULATED.3IONS-SCNC: 7 MMOL/L (ref 4–13)
ATRIAL RATE: 107 BPM
BASOPHILS # BLD AUTO: 0.04 THOUSANDS/ΜL (ref 0–0.1)
BASOPHILS # BLD AUTO: 0.05 THOUSANDS/ΜL (ref 0–0.1)
BASOPHILS NFR BLD AUTO: 0 % (ref 0–1)
BASOPHILS NFR BLD AUTO: 0 % (ref 0–1)
BUN SERPL-MCNC: 10 MG/DL (ref 5–25)
CALCIUM SERPL-MCNC: 9.2 MG/DL (ref 8.3–10.1)
CHLORIDE SERPL-SCNC: 106 MMOL/L (ref 100–108)
CO2 SERPL-SCNC: 24 MMOL/L (ref 21–32)
CREAT SERPL-MCNC: 0.85 MG/DL (ref 0.6–1.3)
EOSINOPHIL # BLD AUTO: 0.23 THOUSAND/ΜL (ref 0–0.61)
EOSINOPHIL # BLD AUTO: 0.44 THOUSAND/ΜL (ref 0–0.61)
EOSINOPHIL NFR BLD AUTO: 2 % (ref 0–6)
EOSINOPHIL NFR BLD AUTO: 5 % (ref 0–6)
ERYTHROCYTE [DISTWIDTH] IN BLOOD BY AUTOMATED COUNT: 13.4 % (ref 11.6–15.1)
ERYTHROCYTE [DISTWIDTH] IN BLOOD BY AUTOMATED COUNT: 13.5 % (ref 11.6–15.1)
GFR SERPL CREATININE-BSD FRML MDRD: 105 ML/MIN/1.73SQ M
GLUCOSE SERPL-MCNC: 65 MG/DL (ref 65–140)
HCT VFR BLD AUTO: 37.9 % (ref 36.5–49.3)
HCT VFR BLD AUTO: 46.1 % (ref 36.5–49.3)
HGB BLD-MCNC: 12.4 G/DL (ref 12–17)
HGB BLD-MCNC: 15.2 G/DL (ref 12–17)
IMM GRANULOCYTES # BLD AUTO: 0.04 THOUSAND/UL (ref 0–0.2)
IMM GRANULOCYTES # BLD AUTO: 0.07 THOUSAND/UL (ref 0–0.2)
IMM GRANULOCYTES NFR BLD AUTO: 0 % (ref 0–2)
IMM GRANULOCYTES NFR BLD AUTO: 1 % (ref 0–2)
L PNEUMO1 AG UR QL IA.RAPID: NEGATIVE
LYMPHOCYTES # BLD AUTO: 0.96 THOUSANDS/ΜL (ref 0.6–4.47)
LYMPHOCYTES # BLD AUTO: 1.33 THOUSANDS/ΜL (ref 0.6–4.47)
LYMPHOCYTES NFR BLD AUTO: 14 % (ref 14–44)
LYMPHOCYTES NFR BLD AUTO: 7 % (ref 14–44)
MCH RBC QN AUTO: 30.8 PG (ref 26.8–34.3)
MCH RBC QN AUTO: 31 PG (ref 26.8–34.3)
MCHC RBC AUTO-ENTMCNC: 32.7 G/DL (ref 31.4–37.4)
MCHC RBC AUTO-ENTMCNC: 33 G/DL (ref 31.4–37.4)
MCV RBC AUTO: 94 FL (ref 82–98)
MCV RBC AUTO: 94 FL (ref 82–98)
MONOCYTES # BLD AUTO: 1.1 THOUSAND/ΜL (ref 0.17–1.22)
MONOCYTES # BLD AUTO: 1.37 THOUSAND/ΜL (ref 0.17–1.22)
MONOCYTES NFR BLD AUTO: 10 % (ref 4–12)
MONOCYTES NFR BLD AUTO: 12 % (ref 4–12)
NEUTROPHILS # BLD AUTO: 11.12 THOUSANDS/ΜL (ref 1.85–7.62)
NEUTROPHILS # BLD AUTO: 6.62 THOUSANDS/ΜL (ref 1.85–7.62)
NEUTS SEG NFR BLD AUTO: 69 % (ref 43–75)
NEUTS SEG NFR BLD AUTO: 80 % (ref 43–75)
NRBC BLD AUTO-RTO: 0 /100 WBCS
NRBC BLD AUTO-RTO: 0 /100 WBCS
P AXIS: 31 DEGREES
PLATELET # BLD AUTO: 378 THOUSANDS/UL (ref 149–390)
PLATELET # BLD AUTO: 438 THOUSANDS/UL (ref 149–390)
PMV BLD AUTO: 9.2 FL (ref 8.9–12.7)
PMV BLD AUTO: 9.6 FL (ref 8.9–12.7)
POTASSIUM SERPL-SCNC: 3.7 MMOL/L (ref 3.5–5.3)
PR INTERVAL: 198 MS
QRS AXIS: -4 DEGREES
QRSD INTERVAL: 96 MS
QT INTERVAL: 336 MS
QTC INTERVAL: 448 MS
RBC # BLD AUTO: 4.02 MILLION/UL (ref 3.88–5.62)
RBC # BLD AUTO: 4.91 MILLION/UL (ref 3.88–5.62)
S PNEUM AG UR QL: NEGATIVE
SODIUM SERPL-SCNC: 137 MMOL/L (ref 136–145)
T WAVE AXIS: 3 DEGREES
TROPONIN I SERPL-MCNC: <0.02 NG/ML
VENTRICULAR RATE: 107 BPM
WBC # BLD AUTO: 13.8 THOUSAND/UL (ref 4.31–10.16)
WBC # BLD AUTO: 9.57 THOUSAND/UL (ref 4.31–10.16)

## 2021-01-31 PROCEDURE — 3E0L3GC INTRODUCTION OF OTHER THERAPEUTIC SUBSTANCE INTO PLEURAL CAVITY, PERCUTANEOUS APPROACH: ICD-10-PCS | Performed by: RADIOLOGY

## 2021-01-31 PROCEDURE — 87449 NOS EACH ORGANISM AG IA: CPT | Performed by: FAMILY MEDICINE

## 2021-01-31 PROCEDURE — 71045 X-RAY EXAM CHEST 1 VIEW: CPT

## 2021-01-31 PROCEDURE — NC001 PR NO CHARGE: Performed by: HOSPITALIST

## 2021-01-31 PROCEDURE — 0W9B30Z DRAINAGE OF LEFT PLEURAL CAVITY WITH DRAINAGE DEVICE, PERCUTANEOUS APPROACH: ICD-10-PCS | Performed by: RADIOLOGY

## 2021-01-31 PROCEDURE — 93005 ELECTROCARDIOGRAM TRACING: CPT

## 2021-01-31 PROCEDURE — 85025 COMPLETE CBC W/AUTO DIFF WBC: CPT | Performed by: INTERNAL MEDICINE

## 2021-01-31 PROCEDURE — 84484 ASSAY OF TROPONIN QUANT: CPT | Performed by: INTERNAL MEDICINE

## 2021-01-31 PROCEDURE — NC001 PR NO CHARGE: Performed by: RADIOLOGY

## 2021-01-31 PROCEDURE — 99222 1ST HOSP IP/OBS MODERATE 55: CPT | Performed by: HOSPITALIST

## 2021-01-31 PROCEDURE — 80048 BASIC METABOLIC PNL TOTAL CA: CPT | Performed by: STUDENT IN AN ORGANIZED HEALTH CARE EDUCATION/TRAINING PROGRAM

## 2021-01-31 PROCEDURE — 85025 COMPLETE CBC W/AUTO DIFF WBC: CPT | Performed by: STUDENT IN AN ORGANIZED HEALTH CARE EDUCATION/TRAINING PROGRAM

## 2021-01-31 PROCEDURE — 93010 ELECTROCARDIOGRAM REPORT: CPT | Performed by: INTERNAL MEDICINE

## 2021-01-31 RX ORDER — ACETAMINOPHEN 325 MG/1
975 TABLET ORAL EVERY 8 HOURS SCHEDULED
Status: DISCONTINUED | OUTPATIENT
Start: 2021-01-31 | End: 2021-02-03 | Stop reason: HOSPADM

## 2021-01-31 RX ORDER — HYDROMORPHONE HCL/PF 1 MG/ML
0.5 SYRINGE (ML) INJECTION ONCE
Status: COMPLETED | OUTPATIENT
Start: 2021-01-31 | End: 2021-01-31

## 2021-01-31 RX ORDER — NICOTINE 21 MG/24HR
21 PATCH, TRANSDERMAL 24 HOURS TRANSDERMAL DAILY
Status: DISCONTINUED | OUTPATIENT
Start: 2021-02-01 | End: 2021-02-03 | Stop reason: HOSPADM

## 2021-01-31 RX ORDER — LEVOTHYROXINE SODIUM 0.03 MG/1
25 TABLET ORAL
Status: DISCONTINUED | OUTPATIENT
Start: 2021-01-31 | End: 2021-02-03 | Stop reason: HOSPADM

## 2021-01-31 RX ORDER — HYDROMORPHONE HCL/PF 1 MG/ML
0.5 SYRINGE (ML) INJECTION
Status: DISCONTINUED | OUTPATIENT
Start: 2021-01-31 | End: 2021-01-31

## 2021-01-31 RX ORDER — SODIUM CHLORIDE 9 MG/ML
100 INJECTION, SOLUTION INTRAVENOUS CONTINUOUS
Status: DISPENSED | OUTPATIENT
Start: 2021-01-31 | End: 2021-02-01

## 2021-01-31 RX ORDER — OXYCODONE HYDROCHLORIDE 5 MG/1
5 TABLET ORAL ONCE AS NEEDED
Status: COMPLETED | OUTPATIENT
Start: 2021-01-31 | End: 2021-01-31

## 2021-01-31 RX ORDER — HYDROMORPHONE HCL/PF 1 MG/ML
0.5 SYRINGE (ML) INJECTION EVERY 4 HOURS PRN
Status: DISCONTINUED | OUTPATIENT
Start: 2021-01-31 | End: 2021-02-03 | Stop reason: HOSPADM

## 2021-01-31 RX ADMIN — DOXYCYCLINE 100 MG: 100 INJECTION, POWDER, LYOPHILIZED, FOR SOLUTION INTRAVENOUS at 22:36

## 2021-01-31 RX ADMIN — DOXYCYCLINE 100 MG: 100 INJECTION, POWDER, LYOPHILIZED, FOR SOLUTION INTRAVENOUS at 13:07

## 2021-01-31 RX ADMIN — DORNASE ALFA 5 MG: 1 SOLUTION RESPIRATORY (INHALATION) at 10:00

## 2021-01-31 RX ADMIN — ACETAMINOPHEN 650 MG: 325 TABLET ORAL at 04:09

## 2021-01-31 RX ADMIN — PIPERACILLIN AND TAZOBACTAM 3.38 G: 36; 4.5 INJECTION, POWDER, FOR SOLUTION INTRAVENOUS at 00:59

## 2021-01-31 RX ADMIN — HYDROMORPHONE HYDROCHLORIDE 0.5 MG: 1 INJECTION, SOLUTION INTRAMUSCULAR; INTRAVENOUS; SUBCUTANEOUS at 11:48

## 2021-01-31 RX ADMIN — SODIUM CHLORIDE 100 ML/HR: 0.9 INJECTION, SOLUTION INTRAVENOUS at 13:56

## 2021-01-31 RX ADMIN — ACETAMINOPHEN 975 MG: 325 TABLET ORAL at 22:35

## 2021-01-31 RX ADMIN — NICOTINE 21 MG: 21 PATCH, EXTENDED RELEASE TRANSDERMAL at 09:06

## 2021-01-31 RX ADMIN — ENOXAPARIN SODIUM 40 MG: 40 INJECTION SUBCUTANEOUS at 09:06

## 2021-01-31 RX ADMIN — ACETAMINOPHEN 975 MG: 325 TABLET ORAL at 13:52

## 2021-01-31 RX ADMIN — Medication 10 MG: at 10:00

## 2021-01-31 RX ADMIN — HYDROMORPHONE HYDROCHLORIDE 0.5 MG: 1 INJECTION, SOLUTION INTRAMUSCULAR; INTRAVENOUS; SUBCUTANEOUS at 12:56

## 2021-01-31 RX ADMIN — CEFTRIAXONE SODIUM 1000 MG: 10 INJECTION, POWDER, FOR SOLUTION INTRAVENOUS at 12:32

## 2021-01-31 RX ADMIN — LEVOTHYROXINE SODIUM 25 MCG: 25 TABLET ORAL at 09:06

## 2021-01-31 RX ADMIN — VANCOMYCIN HYDROCHLORIDE 1250 MG: 10 INJECTION, POWDER, LYOPHILIZED, FOR SOLUTION INTRAVENOUS at 05:12

## 2021-01-31 RX ADMIN — PIPERACILLIN AND TAZOBACTAM 3.38 G: 36; 4.5 INJECTION, POWDER, FOR SOLUTION INTRAVENOUS at 07:31

## 2021-01-31 RX ADMIN — OXYCODONE HYDROCHLORIDE 5 MG: 5 TABLET ORAL at 10:39

## 2021-01-31 NOTE — PROGRESS NOTES
Alteplase and Dornase instilled into Left Chest tube as ordered   RN instructed to re-connect Chest Tube in 2 hours  Demonstrated how to re-connect back to suction

## 2021-01-31 NOTE — PROGRESS NOTES
Progress Note -Interventional Radiology  Paris Davis 39 y o  male MRN: [id number]  Unit/Bed#: -01 Encounter: [id number]    Assessment/Plan:    Patient is POD 1 status post placement of left chest tube under ultrasound  As originally expected, the effusion was complex  Only a small amount of fluid was removed at the time of the procedure, and only 28 mL has drained overnight  Plan for tPA/Dornase to be administered today via the chest tube  Problem List     * (Principal) Pleural effusion, left    Acquired hypothyroidism (Chronic)    History of DVT (deep vein thrombosis) (Chronic)    Tobacco abuse (Chronic)    Sepsis (HCC)    Mild intermittent asthma             Subjective: Paris Davis is a 39 y o  male who presented with progressive shortness of breath over the last week secondary to a large pleural effusion on the left, presumably secondary to pneumonitis/pneumonia  Objective:    Vitals:  /80   Pulse 95   Temp 98 1 °F (36 7 °C)   Resp 18   Ht 6' (1 829 m)   Wt 111 kg (244 lb 11 4 oz)   SpO2 93%   BMI 33 19 kg/m²   Body mass index is 33 19 kg/m²    Weight (last 2 days)     Date/Time   Weight    01/31/21 0535   111 (244 71)              I/Os:    Intake/Output Summary (Last 24 hours) at 1/31/2021 0757  Last data filed at 1/31/2021 0320  Gross per 24 hour   Intake --   Output 28 ml   Net -28 ml       Invasive Devices     Peripheral Intravenous Line            Peripheral IV 01/29/21 Right Antecubital 1 day          Drain            Chest Tube 1 Left 12 Fr  less than 1 day                   Lab Results and Cultures:   CBC with diff:   Lab Results   Component Value Date    WBC 9 57 01/31/2021    HGB 12 4 01/31/2021    HCT 37 9 01/31/2021    MCV 94 01/31/2021     01/31/2021    MCH 30 8 01/31/2021    MCHC 32 7 01/31/2021    RDW 13 5 01/31/2021    MPV 9 6 01/31/2021    NRBC 0 01/31/2021      BMP/CMP:  Lab Results   Component Value Date    K 3 7 01/31/2021     01/31/2021    CO2 24 01/31/2021    BUN 10 01/31/2021    CREATININE 0 85 01/31/2021    CALCIUM 9 2 01/31/2021    AST 13 01/30/2021    ALT 19 01/30/2021    ALKPHOS 102 01/30/2021    EGFR 105 01/31/2021   ,     Coags:   Lab Results   Component Value Date    INR 1 04 01/30/2021   ,   Results from last 7 days   Lab Units 01/30/21  0558   INR  1 04        Lipid Panel: No results found for: CHOL  Lab Results   Component Value Date    HDL 29 (L) 01/30/2021     Lab Results   Component Value Date    HDL 29 (L) 01/30/2021     Lab Results   Component Value Date    LDLCALC 101 (H) 01/30/2021     Lab Results   Component Value Date    TRIG 86 01/30/2021       HgbA1c: No results found for: HGBA1C    Blood Culture:   Lab Results   Component Value Date    BLOODCX No Growth at 24 hrs  01/29/2021    BLOODCX No Growth at 24 hrs  01/29/2021   ,   Urinalysis:   Lab Results   Component Value Date    COLORU Michelle 01/30/2021    CLARITYU Clear 01/30/2021    SPECGRAV 1 015 01/30/2021    PHUR 6 5 01/30/2021    LEUKOCYTESUR Negative 01/30/2021    NITRITE Negative 01/30/2021    GLUCOSEU Negative 01/30/2021    KETONESU 40 (2+) (A) 01/30/2021    BILIRUBINUR Small (A) 01/30/2021    BLOODU Negative 01/30/2021   ,   Urine Culture: No results found for: URINECX,   Wound Culure:  No results found for: WOUNDCULT      Thank you for allowing me to participate in the care of Jason Black  Please don't hesitate to call, text, email, or TigerText with any questions  This text is generated with voice recognition software  There may be translation, syntax, or grammatical errors  If you have any questions, please contact the dictating provider

## 2021-01-31 NOTE — ASSESSMENT & PLAN NOTE
Present on admission secondary to pneumonia  No longer meets sepsis criteria    · Follow-up blood cultures  · Ceftriaxone and metronidazole for likely CAP

## 2021-01-31 NOTE — PROGRESS NOTES
INTERNAL MEDICINE RESIDENCY SENIOR ADMISSION NOTE     Name: Jason Black   Age & Sex: 39 y o  male   MRN: [id number]  Unit/Bed#: -01   Encounter: [id number]  Primary Care Provider: No primary care provider on file  Admit to team: SOD Team A    Patient seen and examined  Reviewed H&P per Dr Hazel Jimenez  Agree with the assessment and plan with any exception/addition as noted below:  Agree with plan as below but would restart Levothyroxine 50mcg as patient has Subclinical Hypothyroidism         Principal Problem:    Pleural effusion, left  Active Problems:    Acquired hypothyroidism    History of DVT (deep vein thrombosis)    Tobacco abuse    Sepsis (HCC)    Mild intermittent asthma      Code Status: Level 1 - Full Code  Admission Status: INPATIENT   Disposition: Patient requires Med/Surg  Expected Length of Stay: Unknown

## 2021-01-31 NOTE — PLAN OF CARE
Problem: Potential for Falls  Goal: Patient will remain free of falls  Description: INTERVENTIONS:  - Assess patient frequently for physical needs  -  Identify cognitive and physical deficits and behaviors that affect risk of falls    -  Protem fall precautions as indicated by assessment   - Educate patient/family on patient safety including physical limitations  - Instruct patient to call for assistance with activity based on assessment  - Modify environment to reduce risk of injury  - Consider OT/PT consult to assist with strengthening/mobility  Outcome: Progressing     Problem: PAIN - ADULT  Goal: Verbalizes/displays adequate comfort level or baseline comfort level  Description: Interventions:  - Encourage patient to monitor pain and request assistance  - Assess pain using appropriate pain scale  - Administer analgesics based on type and severity of pain and evaluate response  - Implement non-pharmacological measures as appropriate and evaluate response  - Consider cultural and social influences on pain and pain management  - Notify physician/advanced practitioner if interventions unsuccessful or patient reports new pain  Outcome: Progressing     Problem: INFECTION - ADULT  Goal: Absence or prevention of progression during hospitalization  Description: INTERVENTIONS:  - Assess and monitor for signs and symptoms of infection  - Monitor lab/diagnostic results  - Monitor all insertion sites, i e  indwelling lines, tubes, and drains  - Monitor endotracheal if appropriate and nasal secretions for changes in amount and color  - Protem appropriate cooling/warming therapies per order  - Administer medications as ordered  - Instruct and encourage patient and family to use good hand hygiene technique  - Identify and instruct in appropriate isolation precautions for identified infection/condition  Outcome: Progressing  Goal: Absence of fever/infection during neutropenic period  Description: INTERVENTIONS:  - Monitor WBC    Outcome: Progressing     Problem: SAFETY ADULT  Goal: Patient will remain free of falls  Description: INTERVENTIONS:  - Assess patient frequently for physical needs  -  Identify cognitive and physical deficits and behaviors that affect risk of falls    -  Turner fall precautions as indicated by assessment   - Educate patient/family on patient safety including physical limitations  - Instruct patient to call for assistance with activity based on assessment  - Modify environment to reduce risk of injury  - Consider OT/PT consult to assist with strengthening/mobility  Outcome: Progressing  Goal: Maintain or return to baseline ADL function  Description: INTERVENTIONS:  -  Assess patient's ability to carry out ADLs; assess patient's baseline for ADL function and identify physical deficits which impact ability to perform ADLs (bathing, care of mouth/teeth, toileting, grooming, dressing, etc )  - Assess/evaluate cause of self-care deficits   - Assess range of motion  - Assess patient's mobility; develop plan if impaired  - Assess patient's need for assistive devices and provide as appropriate  - Encourage maximum independence but intervene and supervise when necessary  - Involve family in performance of ADLs  - Assess for home care needs following discharge   - Consider OT consult to assist with ADL evaluation and planning for discharge  - Provide patient education as appropriate  Outcome: Progressing  Goal: Maintain or return mobility status to optimal level  Description: INTERVENTIONS:  - Assess patient's baseline mobility status (ambulation, transfers, stairs, etc )    - Identify cognitive and physical deficits and behaviors that affect mobility  - Identify mobility aids required to assist with transfers and/or ambulation (gait belt, sit-to-stand, lift, walker, cane, etc )  - Turner fall precautions as indicated by assessment  - Record patient progress and toleration of activity level on Mobility SBAR; progress patient to next Phase/Stage  - Instruct patient to call for assistance with activity based on assessment  - Consider rehabilitation consult to assist with strengthening/weightbearing, etc   Outcome: Progressing     Problem: DISCHARGE PLANNING  Goal: Discharge to home or other facility with appropriate resources  Description: INTERVENTIONS:  - Identify barriers to discharge w/patient and caregiver  - Arrange for needed discharge resources and transportation as appropriate  - Identify discharge learning needs (meds, wound care, etc )  - Arrange for interpretive services to assist at discharge as needed  - Refer to Case Management Department for coordinating discharge planning if the patient needs post-hospital services based on physician/advanced practitioner order or complex needs related to functional status, cognitive ability, or social support system  Outcome: Progressing     Problem: Knowledge Deficit  Goal: Patient/family/caregiver demonstrates understanding of disease process, treatment plan, medications, and discharge instructions  Description: Complete learning assessment and assess knowledge base    Interventions:  - Provide teaching at level of understanding  - Provide teaching via preferred learning methods  Outcome: Progressing     Problem: NEUROSENSORY - ADULT  Goal: Achieves stable or improved neurological status  Description: INTERVENTIONS  - Monitor and report changes in neurological status  - Monitor vital signs such as temperature, blood pressure, glucose, and any other labs ordered   - Initiate measures to prevent increased intracranial pressure  - Monitor for seizure activity and implement precautions if appropriate      Outcome: Progressing  Goal: Remains free of injury related to seizures activity  Description: INTERVENTIONS  - Maintain airway, patient safety  and administer oxygen as ordered  - Monitor patient for seizure activity, document and report duration and description of seizure to physician/advanced practitioner  - If seizure occurs,  ensure patient safety during seizure  - Reorient patient post seizure  - Seizure pads on all 4 side rails  - Instruct patient/family to notify RN of any seizure activity including if an aura is experienced  - Instruct patient/family to call for assistance with activity based on nursing assessment  - Administer anti-seizure medications if ordered    Outcome: Progressing  Goal: Achieves maximal functionality and self care  Description: INTERVENTIONS  - Monitor swallowing and airway patency with patient fatigue and changes in neurological status  - Encourage and assist patient to increase activity and self care     - Encourage visually impaired, hearing impaired and aphasic patients to use assistive/communication devices  Outcome: Progressing     Problem: GASTROINTESTINAL - ADULT  Goal: Minimal or absence of nausea and/or vomiting  Description: INTERVENTIONS:  - Administer IV fluids if ordered to ensure adequate hydration  - Maintain NPO status until nausea and vomiting are resolved  - Nasogastric tube if ordered  - Administer ordered antiemetic medications as needed  - Provide nonpharmacologic comfort measures as appropriate  - Advance diet as tolerated, if ordered  - Consider nutrition services referral to assist patient with adequate nutrition and appropriate food choices  Outcome: Progressing  Goal: Maintains or returns to baseline bowel function  Description: INTERVENTIONS:  - Assess bowel function  - Encourage oral fluids to ensure adequate hydration  - Administer IV fluids if ordered to ensure adequate hydration  - Administer ordered medications as needed  - Encourage mobilization and activity  - Consider nutritional services referral to assist patient with adequate nutrition and appropriate food choices  Outcome: Progressing  Goal: Maintains adequate nutritional intake  Description: INTERVENTIONS:  - Monitor percentage of each meal consumed  - Identify factors contributing to decreased intake, treat as appropriate  - Assist with meals as needed  - Monitor I&O, weight, and lab values if indicated  - Obtain nutrition services referral as needed  Outcome: Progressing  Goal: Establish and maintain optimal ostomy function  Description: INTERVENTIONS:  - Assess bowel function  - Encourage oral fluids to ensure adequate hydration  - Administer IV fluids if ordered to ensure adequate hydration   - Administer ordered medications as needed  - Encourage mobilization and activity  - Nutrition services referral to assist patient with appropriate food choices  - Assess stoma site  - Consider wound care consult   Outcome: Progressing     Problem: RESPIRATORY - ADULT  Goal: Achieves optimal ventilation and oxygenation  Description: INTERVENTIONS:  - Assess for changes in respiratory status  - Assess for changes in mentation and behavior  - Position to facilitate oxygenation and minimize respiratory effort  - Oxygen administered by appropriate delivery if ordered  - Initiate smoking cessation education as indicated  - Encourage broncho-pulmonary hygiene including cough, deep breathe, Incentive Spirometry  - Assess the need for suctioning and aspirate as needed  - Assess and instruct to report SOB or any respiratory difficulty  - Respiratory Therapy support as indicated  Outcome: Progressing     Problem: METABOLIC, FLUID AND ELECTROLYTES - ADULT  Goal: Electrolytes maintained within normal limits  Description: INTERVENTIONS:  - Monitor labs and assess patient for signs and symptoms of electrolyte imbalances  - Administer electrolyte replacement as ordered  - Monitor response to electrolyte replacements, including repeat lab results as appropriate  - Instruct patient on fluid and nutrition as appropriate  Outcome: Progressing  Goal: Fluid balance maintained  Description: INTERVENTIONS:  - Monitor labs   - Monitor I/O and WT  - Instruct patient on fluid and nutrition as appropriate  - Assess for signs & symptoms of volume excess or deficit  Outcome: Progressing  Goal: Glucose maintained within target range  Description: INTERVENTIONS:  - Monitor Blood Glucose as ordered  - Assess for signs and symptoms of hyperglycemia and hypoglycemia  - Administer ordered medications to maintain glucose within target range  - Assess nutritional intake and initiate nutrition service referral as needed  Outcome: Progressing     Problem: SKIN/TISSUE INTEGRITY - ADULT  Goal: Skin integrity remains intact  Description: INTERVENTIONS  - Identify patients at risk for skin breakdown  - Assess and monitor skin integrity  - Assess and monitor nutrition and hydration status  - Monitor labs (i e  albumin)  - Assess for incontinence   - Turn and reposition patient  - Assist with mobility/ambulation  - Relieve pressure over bony prominences  - Avoid friction and shearing  - Provide appropriate hygiene as needed including keeping skin clean and dry  - Evaluate need for skin moisturizer/barrier cream  - Collaborate with interdisciplinary team (i e  Nutrition, Rehabilitation, etc )   - Patient/family teaching  Outcome: Progressing  Goal: Incision(s), wounds(s) or drain site(s) healing without S/S of infection  Description: INTERVENTIONS  - Assess and document risk factors for skin impairment   - Assess and document dressing, incision, wound bed, drain sites and surrounding tissue  - Consider nutrition services referral as needed  - Oral mucous membranes remain intact  - Provide patient/ family education  Outcome: Progressing  Goal: Oral mucous membranes remain intact  Description: INTERVENTIONS  - Assess oral mucosa and hygiene practices  - Implement preventative oral hygiene regimen  - Implement oral medicated treatments as ordered  - Initiate Nutrition services referral as needed  Outcome: Progressing     Problem: HEMATOLOGIC - ADULT  Goal: Maintains hematologic stability  Description: INTERVENTIONS  - Assess for signs and symptoms of bleeding or hemorrhage  - Monitor labs  - Administer supportive blood products/factors as ordered and appropriate  Outcome: Progressing     Problem: MUSCULOSKELETAL - ADULT  Goal: Maintain or return mobility to safest level of function  Description: INTERVENTIONS:  - Assess patient's ability to carry out ADLs; assess patient's baseline for ADL function and identify physical deficits which impact ability to perform ADLs (bathing, care of mouth/teeth, toileting, grooming, dressing, etc )  - Assess/evaluate cause of self-care deficits   - Assess range of motion  - Assess patient's mobility  - Assess patient's need for assistive devices and provide as appropriate  - Encourage maximum independence but intervene and supervise when necessary  - Involve family in performance of ADLs  - Assess for home care needs following discharge   - Consider OT consult to assist with ADL evaluation and planning for discharge  - Provide patient education as appropriate  Outcome: Progressing  Goal: Maintain proper alignment of affected body part  Description: INTERVENTIONS:  - Support, maintain and protect limb and body alignment  - Provide patient/ family with appropriate education  Outcome: Progressing

## 2021-01-31 NOTE — CONSULTS
Vancomycin Pharmacy Consult      Vancomycin has been discontinued; Pharmacy will sign off now  Thank you for involving us in this patient's care  Please do not hesitate to reach back out to Pharmacy  Hall Setter Claudetta Ganong, OjD  Internal Medicine Clinical Pharmacist Specialist  [phone]  [facility]/Te[employer]

## 2021-01-31 NOTE — SEDATION DOCUMENTATION
Patient tolerated chest tube insertions without any immediate complications  Patient offers no comaplitns of pain or nause at this time  40ml of clear yellow fluid obtained and sent to lab per orders     Bed rest start time now

## 2021-01-31 NOTE — ASSESSMENT & PLAN NOTE
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 16 s/p radioactive iodine  Patient not talking Levothyroxine for "Many" years and denies any related symptoms     Plan  · Levothyroxine 25 mg

## 2021-01-31 NOTE — ASSESSMENT & PLAN NOTE
Smoking 1 pack a day since age 25 Quit since January 25 in context of shortness of breath and chest pain  Patient is motivated to quit      Plan  · Nicotine patch 21 mg daily    Outpatient plan:  · Continue nicotine patch and add on a gum for shorter acting control  · Recommend patient use [employer] to search for local PCP to establish care

## 2021-01-31 NOTE — PLAN OF CARE
Problem: Potential for Falls  Goal: Patient will remain free of falls  Description: INTERVENTIONS:  - Assess patient frequently for physical needs  -  Identify cognitive and physical deficits and behaviors that affect risk of falls    -  [place] fall precautions as indicated by assessment   - Educate patient/family on patient safety including physical limitations  - Instruct patient to call for assistance with activity based on assessment  - Modify environment to reduce risk of injury  - Consider OT/PT consult to assist with strengthening/mobility  Outcome: Progressing     Problem: PAIN - ADULT  Goal: Verbalizes/displays adequate comfort level or baseline comfort level  Description: Interventions:  - Encourage patient to monitor pain and request assistance  - Assess pain using appropriate pain scale  - Administer analgesics based on type and severity of pain and evaluate response  - Implement non-pharmacological measures as appropriate and evaluate response  - Consider cultural and social influences on pain and pain management  - Notify physician/advanced practitioner if interventions unsuccessful or patient reports new pain  Outcome: Progressing     Problem: INFECTION - ADULT  Goal: Absence or prevention of progression during hospitalization  Description: INTERVENTIONS:  - Assess and monitor for signs and symptoms of infection  - Monitor lab/diagnostic results  - Monitor all insertion sites, i e  indwelling lines, tubes, and drains  - Monitor endotracheal if appropriate and nasal secretions for changes in amount and color  - [place] appropriate cooling/warming therapies per order  - Administer medications as ordered  - Instruct and encourage patient and family to use good hand hygiene technique  - Identify and instruct in appropriate isolation precautions for identified infection/condition  Outcome: Progressing  Goal: Absence of fever/infection during neutropenic period  Description: INTERVENTIONS:  - Monitor WBC    Outcome: Progressing     Problem: SAFETY ADULT  Goal: Patient will remain free of falls  Description: INTERVENTIONS:  - Assess patient frequently for physical needs  -  Identify cognitive and physical deficits and behaviors that affect risk of falls    -  [place] fall precautions as indicated by assessment   - Educate patient/family on patient safety including physical limitations  - Instruct patient to call for assistance with activity based on assessment  - Modify environment to reduce risk of injury  - Consider OT/PT consult to assist with strengthening/mobility  Outcome: Progressing  Goal: Maintain or return to baseline ADL function  Description: INTERVENTIONS:  -  Assess patient's ability to carry out ADLs; assess patient's baseline for ADL function and identify physical deficits which impact ability to perform ADLs (bathing, care of mouth/teeth, toileting, grooming, dressing, etc )  - Assess/evaluate cause of self-care deficits   - Assess range of motion  - Assess patient's mobility; develop plan if impaired  - Assess patient's need for assistive devices and provide as appropriate  - Encourage maximum independence but intervene and supervise when necessary  - Involve family in performance of ADLs  - Assess for home care needs following discharge   - Consider OT consult to assist with ADL evaluation and planning for discharge  - Provide patient education as appropriate  Outcome: Progressing  Goal: Maintain or return mobility status to optimal level  Description: INTERVENTIONS:  - Assess patient's baseline mobility status (ambulation, transfers, stairs, etc )    - Identify cognitive and physical deficits and behaviors that affect mobility  - Identify mobility aids required to assist with transfers and/or ambulation (gait belt, sit-to-stand, lift, walker, cane, etc )  - [place] fall precautions as indicated by assessment  - Record patient progress and toleration of activity level on Mobility SBAR; progress patient to next Phase/Stage  - Instruct patient to call for assistance with activity based on assessment  - Consider rehabilitation consult to assist with strengthening/weightbearing, etc   Outcome: Progressing     Problem: DISCHARGE PLANNING  Goal: Discharge to home or other facility with appropriate resources  Description: INTERVENTIONS:  - Identify barriers to discharge w/patient and caregiver  - Arrange for needed discharge resources and transportation as appropriate  - Identify discharge learning needs (meds, wound care, etc )  - Arrange for interpretive services to assist at discharge as needed  - Refer to Case Management Department for coordinating discharge planning if the patient needs post-hospital services based on physician/advanced practitioner order or complex needs related to functional status, cognitive ability, or social support system  Outcome: Progressing     Problem: Knowledge Deficit  Goal: Patient/family/caregiver demonstrates understanding of disease process, treatment plan, medications, and discharge instructions  Description: Complete learning assessment and assess knowledge base    Interventions:  - Provide teaching at level of understanding  - Provide teaching via preferred learning methods  Outcome: Progressing     Problem: NEUROSENSORY - ADULT  Goal: Achieves stable or improved neurological status  Description: INTERVENTIONS  - Monitor and report changes in neurological status  - Monitor vital signs such as temperature, blood pressure, glucose, and any other labs ordered   - Initiate measures to prevent increased intracranial pressure  - Monitor for seizure activity and implement precautions if appropriate      Outcome: Progressing  Goal: Remains free of injury related to seizures activity  Description: INTERVENTIONS  - Maintain airway, patient safety  and administer oxygen as ordered  - Monitor patient for seizure activity, document and report duration and description of seizure to physician/advanced practitioner  - If seizure occurs,  ensure patient safety during seizure  - Reorient patient post seizure  - Seizure pads on all 4 side rails  - Instruct patient/family to notify RN of any seizure activity including if an aura is experienced  - Instruct patient/family to call for assistance with activity based on nursing assessment  - Administer anti-seizure medications if ordered    Outcome: Progressing  Goal: Achieves maximal functionality and self care  Description: INTERVENTIONS  - Monitor swallowing and airway patency with patient fatigue and changes in neurological status  - Encourage and assist patient to increase activity and self care     - Encourage visually impaired, hearing impaired and aphasic patients to use assistive/communication devices  Outcome: Progressing     Problem: CARDIOVASCULAR - ADULT  Goal: Maintains optimal cardiac output and hemodynamic stability  Description: INTERVENTIONS:  - Monitor I/O, vital signs and rhythm  - Monitor for S/S and trends of decreased cardiac output  - Administer and titrate ordered vasoactive medications to optimize hemodynamic stability  - Assess quality of pulses, skin color and temperature  - Assess for signs of decreased coronary artery perfusion  - Instruct patient to report change in severity of symptoms  Outcome: Progressing  Goal: Absence of cardiac dysrhythmias or at baseline rhythm  Description: INTERVENTIONS:  - Continuous cardiac monitoring, vital signs, obtain 12 lead EKG if ordered  - Administer antiarrhythmic and heart rate control medications as ordered  - Monitor electrolytes and administer replacement therapy as ordered  Outcome: Progressing     Problem: RESPIRATORY - ADULT  Goal: Achieves optimal ventilation and oxygenation  Description: INTERVENTIONS:  - Assess for changes in respiratory status  - Assess for changes in mentation and behavior  - Position to facilitate oxygenation and minimize respiratory effort  - Oxygen administered by appropriate delivery if ordered  - Initiate smoking cessation education as indicated  - Encourage broncho-pulmonary hygiene including cough, deep breathe, Incentive Spirometry  - Assess the need for suctioning and aspirate as needed  - Assess and instruct to report SOB or any respiratory difficulty  - Respiratory Therapy support as indicated  Outcome: Progressing     Problem: GASTROINTESTINAL - ADULT  Goal: Minimal or absence of nausea and/or vomiting  Description: INTERVENTIONS:  - Administer IV fluids if ordered to ensure adequate hydration  - Maintain NPO status until nausea and vomiting are resolved  - Nasogastric tube if ordered  - Administer ordered antiemetic medications as needed  - Provide nonpharmacologic comfort measures as appropriate  - Advance diet as tolerated, if ordered  - Consider nutrition services referral to assist patient with adequate nutrition and appropriate food choices  Outcome: Progressing  Goal: Maintains or returns to baseline bowel function  Description: INTERVENTIONS:  - Assess bowel function  - Encourage oral fluids to ensure adequate hydration  - Administer IV fluids if ordered to ensure adequate hydration  - Administer ordered medications as needed  - Encourage mobilization and activity  - Consider nutritional services referral to assist patient with adequate nutrition and appropriate food choices  Outcome: Progressing  Goal: Maintains adequate nutritional intake  Description: INTERVENTIONS:  - Monitor percentage of each meal consumed  - Identify factors contributing to decreased intake, treat as appropriate  - Assist with meals as needed  - Monitor I&O, weight, and lab values if indicated  - Obtain nutrition services referral as needed  Outcome: Progressing  Goal: Establish and maintain optimal ostomy function  Description: INTERVENTIONS:  - Assess bowel function  - Encourage oral fluids to ensure adequate hydration  - Administer IV fluids if ordered to ensure adequate hydration   - Administer ordered medications as needed  - Encourage mobilization and activity  - Nutrition services referral to assist patient with appropriate food choices  - Assess stoma site  - Consider wound care consult   Outcome: Progressing     Problem: GENITOURINARY - ADULT  Goal: Maintains or returns to baseline urinary function  Description: INTERVENTIONS:  - Assess urinary function  - Encourage oral fluids to ensure adequate hydration if ordered  - Administer IV fluids as ordered to ensure adequate hydration  - Administer ordered medications as needed  - Offer frequent toileting  - Follow urinary retention protocol if ordered  Outcome: Progressing  Goal: Absence of urinary retention  Description: INTERVENTIONS:  - Assess patients ability to void and empty bladder  - Monitor I/O  - Bladder scan as needed  - Discuss with physician/AP medications to alleviate retention as needed  - Discuss catheterization for long term situations as appropriate  Outcome: Progressing  Goal: Urinary catheter remains patent  Description: INTERVENTIONS:  - Assess patency of urinary catheter  - If patient has a chronic velázquez, consider changing catheter if non-functioning  - Follow guidelines for intermittent irrigation of non-functioning urinary catheter  Outcome: Progressing     Problem: METABOLIC, FLUID AND ELECTROLYTES - ADULT  Goal: Electrolytes maintained within normal limits  Description: INTERVENTIONS:  - Monitor labs and assess patient for signs and symptoms of electrolyte imbalances  - Administer electrolyte replacement as ordered  - Monitor response to electrolyte replacements, including repeat lab results as appropriate  - Instruct patient on fluid and nutrition as appropriate  Outcome: Progressing  Goal: Fluid balance maintained  Description: INTERVENTIONS:  - Monitor labs   - Monitor I/O and WT  - Instruct patient on fluid and nutrition as appropriate  - Assess for signs & symptoms of volume excess or deficit  Outcome: Progressing  Goal: Glucose maintained within target range  Description: INTERVENTIONS:  - Monitor Blood Glucose as ordered  - Assess for signs and symptoms of hyperglycemia and hypoglycemia  - Administer ordered medications to maintain glucose within target range  - Assess nutritional intake and initiate nutrition service referral as needed  Outcome: Progressing     Problem: SKIN/TISSUE INTEGRITY - ADULT  Goal: Skin integrity remains intact  Description: INTERVENTIONS  - Identify patients at risk for skin breakdown  - Assess and monitor skin integrity  - Assess and monitor nutrition and hydration status  - Monitor labs (i e  albumin)  - Assess for incontinence   - Turn and reposition patient  - Assist with mobility/ambulation  - Relieve pressure over bony prominences  - Avoid friction and shearing  - Provide appropriate hygiene as needed including keeping skin clean and dry  - Evaluate need for skin moisturizer/barrier cream  - Collaborate with interdisciplinary team (i e  Nutrition, Rehabilitation, etc )   - Patient/family teaching  Outcome: Progressing  Goal: Incision(s), wounds(s) or drain site(s) healing without S/S of infection  Description: INTERVENTIONS  - Assess and document risk factors for skin impairment   - Assess and document dressing, incision, wound bed, drain sites and surrounding tissue  - Consider nutrition services referral as needed  - Oral mucous membranes remain intact  - Provide patient/ family education  Outcome: Progressing  Goal: Oral mucous membranes remain intact  Description: INTERVENTIONS  - Assess oral mucosa and hygiene practices  - Implement preventative oral hygiene regimen  - Implement oral medicated treatments as ordered  - Initiate Nutrition services referral as needed  Outcome: Progressing     Problem: HEMATOLOGIC - ADULT  Goal: Maintains hematologic stability  Description: INTERVENTIONS  - Assess for signs and symptoms of bleeding or hemorrhage  - Monitor labs  - Administer supportive blood products/factors as ordered and appropriate  Outcome: Progressing     Problem: MUSCULOSKELETAL - ADULT  Goal: Maintain or return mobility to safest level of function  Description: INTERVENTIONS:  - Assess patient's ability to carry out ADLs; assess patient's baseline for ADL function and identify physical deficits which impact ability to perform ADLs (bathing, care of mouth/teeth, toileting, grooming, dressing, etc )  - Assess/evaluate cause of self-care deficits   - Assess range of motion  - Assess patient's mobility  - Assess patient's need for assistive devices and provide as appropriate  - Encourage maximum independence but intervene and supervise when necessary  - Involve family in performance of ADLs  - Assess for home care needs following discharge   - Consider OT consult to assist with ADL evaluation and planning for discharge  - Provide patient education as appropriate  Outcome: Progressing  Goal: Maintain proper alignment of affected body part  Description: INTERVENTIONS:  - Support, maintain and protect limb and body alignment  - Provide patient/ family with appropriate education  Outcome: Progressing

## 2021-01-31 NOTE — ASSESSMENT & PLAN NOTE
Left chest discomfort and shortness of breath started 1/23, per patient was started on Z-Mario 1/25 worsening shortness of breath and chest pain, went back to the ED, was treated with meds - not in the EMR - with temporary relief 1/29 patient went to the ED at [facility] x-ray chest and CT chest showing moderate-to-large loculated left pleural effusion and compressive atelectasis left lung  S/p chest tube  Patient's pleural effusion met criteria for exudative and likely a complicated infusion given loculation  Workup with atypical infection is negative  No positive blood cultures  tPA done on 1//31 by IR-->drained 2450 cc serosanguineous fluid in first 24 hours  Lost IV access 2/1--> PICC placed same day  Post tPA imaging:   CXR 2/1 showed ground-glass opacity in the left lung but no definite effusion  CT 2/1 showed decreased left pleural effusion remaining, small left hydropneumothorax, improved left lung aeration, and soft tissue thickening/fat infiltration at anterior mediastinum bordering left pleural margin  On day of discharge 02/03, patient desaturated to mid 80s with ambulation  RT evaluated the patient for home O2, and he did not desaturate (97% at rest and 91% with ambulation)    · Ceftriaxone--2000mg q25h (Day 5)   · Starting Metronidazole (Day 3) for anaerobic coverage  · Follow-up cultures  · IR removed drain 2/2-->will observe for 24 hours  · Wean oxygen as tolerated  · Smoking cessation strongly encouraged  · Not eligible for home O2    Outpatient plan:  · Augmentin PO 10 day course  · Recommend patient use [employer] to search for local PCP to establish care

## 2021-01-31 NOTE — QUICK NOTE
Called by nurse regarding patient's chest pain after TPA infusion  Patient seen and examined at bedside - Stat CXR shows no pneumothorax, patient tachycardic but otherwise hemodynamically stable, no evidence of reaction on exam, breath sounds bilaterally, given dilaudid with improvement of symptoms, troponin and EKG negative, IR contacted regarding Chest tube, patients oxygen requirements initially increased, suction was decreased as per IR, patient became stable and now oxygen requirements no decreasing  Patient now a and o, appears comfortable, tachypnea but does not appear to be in respiratory distress and oxygen requirements now improving will monitor patient closely  No rash, hives, wheezing on exam, chest pain has now resolved with suction decreasing   As noted to have a lot of bloody drainage from the chest tube will obtain CBC

## 2021-01-31 NOTE — BRIEF OP NOTE (RAD/CATH)
INTERVENTIONAL RADIOLOGY PROCEDURE NOTE    Date: 1/30/2021    Procedure:  LEFT CHEST TUBE PLACEMENT    Preoperative diagnosis: No diagnosis found  Postoperative diagnosis: Same  Surgeon: Farheen Biswas MD     Assistant: None  No qualified resident was available  Blood loss:  Trace    Specimens:  Sent to lab as requested    Findings:  12 [place] chest tube placed under ultrasound guidance  40 mL cloudy yellow fluid removed  Fluid appeared mildly complex under ultrasound  No tPA/Pulmonase today 2 to risk of bleeding immediately after chest tube placement, but will administer tomorrow  Complications: None immediate      Anesthesia: conscious sedation

## 2021-01-31 NOTE — ASSESSMENT & PLAN NOTE
Hx of mild intermittent asthma  not on any bronchodilator    Plan   Albuterol inhaler prn - no evidence of wheezing today

## 2021-01-31 NOTE — UTILIZATION REVIEW
Initial Clinical Review    Admission: Date/Time/Statement:   Admission Orders (From admission, onward)     Ordered        01/30/21 1858  Inpatient Admission  Once                   Orders Placed This Encounter   Procedures    Inpatient Admission     Standing Status:   Standing     Number of Occurrences:   1     Order Specific Question:   Level of Care     Answer:   Med Surg [16]     Order Specific Question:   Estimated length of stay     Answer:   More than 2 Midnights     Order Specific Question:   Certification     Answer:   I certify that inpatient services are medically necessary for this patient for a duration of greater than two midnights  See H&P and MD Progress Notes for additional information about the patient's course of treatment  Assessment/Plan: 40 yo m w/hx goiter s/p radioactive iodine, provoked dvt transferred  By EMS from [address] surg unit to [address] surg unit, admitted as inpatient due to L pleural effusion  Initially presented to [address] [place] [address] on 1/29 due to worsening L chest pain/sob since 1/23, unimproved after zpak  Found to be be septic from loculated L pleural effusion, r/o empyema for which IV antbx were in progress  Decision made to transfer for higher LOC for chest tube by IR, which can't be done at [address] [place] [address] due to the caliber and thrombolytics  On arrival to [facility], c/o fever, chills, sweats, alejandre, L chest pain-worse with deep breath; mild bilat wheezing  IR consulted, double IV antbx in progress, keeping NPO, cultures pending  1/30 per IR: rapidly enlarging L pleural effusion, 89-91%RA sat, plan for image guided chest tube  If poor output or if effusion looks very loculated, consider TPA  To IR 1/30 @2017 for L chest tube placement under conscious sedation  40ml cloudy yellow fluid removed, appeared mildly complex under US  No TPA due to bleeding risk, but will give tomorrow  1/31: POD#1, effusion was complex as expected   Only 28ml drained overnight  TPA given via chest tube @1005  IV antbx changed to rocephin and doxy  If chest tube output does not improve, will need CT surgery consult  Breathing improved  PM Update: c/o chest pain after TPA, stat CXR showed no pneumo  Tachy, bilateral breath sounds present, dilaudid given with improvement  Trop/EKG negative  o2 initially increased, but no resp distress  Checking CBC due to 'a lot' of bloody drainage from chest tube       Temperature Pulse Respirations Blood Pressure SpO2   01/30/21 1649 01/30/21 1649 01/30/21 1649 01/30/21 1649 01/30/21 1649   98 3 °F (36 8 °C) 105 22 147/98 91 %      Temp Source Heart Rate Source Patient Position - Orthostatic VS BP Location FiO2 (%)   01/30/21 1649 -- -- -- --   Oral          Pain Score       01/30/21 1642       No Pain          Wt Readings from Last 1 Encounters:   01/31/21 111 kg (244 lb 11 4 oz)     Additional Vital Signs:   Date/Time  Temp  Pulse  Resp  BP  MAP (mmHg)  SpO2   Nasal Cannula O2 Flow Rate (L/min)    01/31/21 15:38:11  --  113Abnormal   19  121/84  96  93 %   --    01/31/21 08:02:26  --  --  --  132/60  84  --   --    01/31/21 07:58:16  --  90  20  126/91  103  100 %   --    01/31/21 04:04:39  --  95  --  124/80  95  93 %   --    01/31/21 0341  --  --  --  --  --  --   1 L/min    01/31/21 0300  --  91  --  125/86  99  92 %   --    01/31/21 0200  --  91  --  126/88  101  92 %   --    01/31/21 0100  --  94  --  127/87  100  93 %   1 L/min    01/31/21 0030  --  96  --  123/84  97  92 %   --    01/31/21 0000  --  96  --  126/84  98  92 %   --    01/30/21 2330  --  100  --  127/82  97  92 %   --    01/30/21 2300  --  96  --  132/82  99  90 %   --    01/30/21 22:30:40  --  111Abnormal   --  136/81  99  92 %   --    01/30/21 2214  --  113Abnormal   --  128/94  105  92 %   --    01/30/21 2159  --  106Abnormal   --  129/95  106  91 %   --    01/30/21 2144  --  117Abnormal   --  129/96  107  91 %   --    01/30/21 2129  --  110Abnormal   --  126/94  105  90 %   --    01/30/21 2113  --  109Abnormal   --  123/91  102  92 %   --    01/30/21 20:35:17  --  122Abnormal   --  123/86  98  91 %   --    01/30/21 2014  --  118Abnormal   18  144/79  103  96 %   --    01/30/21 2009  --  120Abnormal   18  162/86  113  96 %   2 L/min    01/30/21 2004  --  124Abnormal   18  158/100  122  97 %   2 L/min    01/30/21 2000  98 1 °F (36 7 °C)  --  --  --  --  --   --    01/30/21 1959  --  128Abnormal   18  159/103Abnormal   124  97 %   2 L/min    01/30/21 1954  --  119Abnormal   18  123/91  104  92 %   --        Pertinent Labs/Diagnostic Test Results:   1/29 @[facility]:  PCXR: Moderate size left pleural effusion  No pneumothorax  CT chest: 1  Moderate to large loculated left pleural effusion  2   Compressive atelectasis throughout the left lung  No evidence of significant underlying airspace consolidation to suggest pneumonia  EKG: sinus tach, incomplete LBBB    At [facility]:  1/31 PCXR: Decreased left pleural effusion with subjacent compressive atelectasis      Results from last 7 days   Lab Units 01/29/21  1722   SARS-COV-2  Negative     Results from last 7 days   Lab Units 01/31/21  1451 01/31/21  0534 01/30/21  0558 01/29/21  1654   WBC Thousand/uL 13 80* 9 57 10 34* 13 03*   HEMOGLOBIN g/dL 15 2 12 4 12 8 14 2   HEMATOCRIT % 46 1 37 9 38 9 42 6   PLATELETS Thousands/uL 438* 378 382 425*   NEUTROS ABS Thousands/µL 11 12* 6 62 7 70* 10 55*         Results from last 7 days   Lab Units 01/31/21  0534 01/30/21  0558 01/29/21  1654   SODIUM mmol/L 137 133* 135*   POTASSIUM mmol/L 3 7 3 7 3 5   CHLORIDE mmol/L 106 101 97*   CO2 mmol/L 24 18* 24   ANION GAP mmol/L 7 14* 14*   BUN mg/dL 10 12 15   CREATININE mg/dL 0 85 0 88 1 16   EGFR ml/min/1 73sq m 105 104 76   CALCIUM mg/dL 9 2 8 3 9 3   MAGNESIUM mg/dL  --  1 8  --    PHOSPHORUS mg/dL  --  2 4*  --      Results from last 7 days   Lab Units 01/30/21  0558 01/29/21  1654   AST U/L 13 13   ALT U/L 19 24   ALK PHOS U/L 102 119*   TOTAL PROTEIN g/dL 7 0 8 5*   ALBUMIN g/dL 2 2* 3 0*   TOTAL BILIRUBIN mg/dL 0 62 0 89         Results from last 7 days   Lab Units 01/31/21  0534 01/30/21  0558 01/29/21  1654   GLUCOSE RANDOM mg/dL 65 79 101       Results from last 7 days   Lab Units 01/31/21  1207 01/29/21  1839   TROPONIN I ng/mL <0 02 <0 02         Results from last 7 days   Lab Units 01/30/21  0558   PROTIME seconds 13 4   INR  1 04     Results from last 7 days   Lab Units 01/29/21  1654   TSH 3RD GENERATON uIU/mL 8 524*     Results from last 7 days   Lab Units 01/29/21  1839   PROCALCITONIN ng/ml 0 42*     Results from last 7 days   Lab Units 01/29/21  1654   LACTIC ACID mmol/L 1 1     Results from last 7 days   Lab Units 01/29/21  1654   NT-PRO BNP pg/mL 26         Results from last 7 days   Lab Units 01/29/21  1654   CRP mg/L 370 5*   SED RATE mm/hour 130*         Results from last 7 days   Lab Units 01/30/21  1417   CLARITY UA  Clear   COLOR UA  Michelle   SPEC GRAV UA  1 015   PH UA  6 5   GLUCOSE UA mg/dl Negative   KETONES UA mg/dl 40 (2+)*   BLOOD UA  Negative   PROTEIN UA mg/dl Trace*   NITRITE UA  Negative   BILIRUBIN UA  Small*   UROBILINOGEN UA E U /dl 2 0*   LEUKOCYTES UA  Negative   WBC UA /hpf None Seen   RBC UA /hpf None Seen   BACTERIA UA /hpf None Seen   EPITHELIAL CELLS WET PREP /hpf Occasional     Results from last 7 days   Lab Units 01/30/21  1417 01/29/21  1722   STREP PNEUMONIAE ANTIGEN, URINE  Negative  --    LEGIONELLA URINARY ANTIGEN  Negative  --    INFLUENZA A PCR   --  Negative   INFLUENZA B PCR   --  Negative   RSV PCR   --  Negative         Results from last 7 days   Lab Units 01/30/21  1417   AMPH/METH  Negative   BARBITURATE UR  Negative   BENZODIAZEPINE UR  Negative   COCAINE UR  Negative   METHADONE URINE  Negative   OPIATE UR  Positive*   PCP UR  Negative   THC UR  Positive*       Results from last 7 days   Lab Units 01/30/21  2016 01/29/21  1654   BLOOD CULTURE   --  No Growth at 24 hrs  No Growth at 24 hrs     GRAM STAIN RESULT  1+ Polys  No bacteria seen  --      Results from last 7 days   Lab Units 01/30/21 2015   TOTAL COUNTED  100   WBC FLUID /ul 6,658       Past Medical History:   Diagnosis Date    Acquired hypothyroidism     DVT (deep venous thrombosis) (HCC) left leg    History of DVT (deep vein thrombosis)     Hypertension     Hyperthyroidism     Pleural effusion, left 1/29/2021    SIRS (systemic inflammatory response syndrome) (HCC)     Tobacco abuse      Present on Admission:   Acquired hypothyroidism   Tobacco abuse   Pleural effusion, left   Sepsis ([facility] [address] )      Admitting Diagnosis: Pleural effusion, left [J90]  Age/Sex: 39 y o  male  Admission Orders:  Scheduled Medications:  acetaminophen, 975 mg, Oral, Q8H [address]  cefTRIAXone, 1,000 mg, Intravenous, Q24H    And  doxycycline, 100 mg, Intravenous, Q12H  enoxaparin, 40 mg, Subcutaneous, Q24H [address]  levothyroxine, 25 mcg, Oral, Early Morning  [START ON 2/1/2021] nicotine, 21 mg, Transdermal, Daily  IV dilaudid x 1 1/31 @1148    piperacillin-tazobactam (ZOSYN) 3 375 g in sodium chloride 0 9 % 100 mL IVPB   Dose: 3 375 g  Freq: Every 6 hours Route: IV  Last Dose: 3 375 g (01/31/21 0731)  Start: 01/30/21 1900 End: 01/31/21 1106    vancomycin (VANCOCIN) 1,250 mg in sodium chloride 0 9 % 250 mL IVPB   Dose: 15 mg/kg  Weight Dosing Info: 91 kg (Adjusted)  Freq: Every 8 hours Route: IV  Last Dose: 1,250 mg (01/31/21 0512)  Start: 01/30/21 2130 End: 01/31/21 1106    Continuous IV Infusions:  sodium chloride, 100 mL/hr, Intravenous, Continuous    sodium chloride 0 9 % with KCl 20 mEq/L infusion (premix)   Rate: 100 mL/hr Dose: 100 mL/hr  Freq: Continuous Route: IV  Last Dose: Stopped (01/31/21 1355)  Start: 01/30/21 1815 End: 01/31/21 1246    PRN Meds:  albuterol, 2 puff, Inhalation, Q4H PRN  docusate sodium, 100 mg, Oral, BID PRN  HYDROmorphone, 0 5 mg, Intravenous, Q4H PRN x 1 1/31 @1256  HYDROmorphone, 1 mg, Intravenous, Q4H PRN  ondansetron, 4 mg, Intravenous, Q4H PRN  PO tylenol x 1 1/30, x 1 1 /31  PO oxycodone x 1 1/31      chest tube to suction  scd's  Keep sat>92%  House diet    IP CONSULT TO PHARMACY  INPATIENT CONSULT TO IR    Network Utilization Review Department  ATTENTION: Please call with any questions or concerns to [phone] and carefully listen to the prompts so that you are directed to the right person  All voicemails are confidential   Valerie Brent all requests for admission clinical reviews, approved or denied determinations and any other requests to dedicated fax number below belonging to the campus where the patient is receiving treatment   List of dedicated fax numbers for the Facilities:  [address] DENIALS (Administrative/Medical Necessity) [phone]   [address] (Maternity/NICU/Pediatrics) [phone]   [address] Oralia Peña 5748 (Page Lawman) [address] [address] Joy Schwartz 1481 [address] [place] [address] [phone]

## 2021-01-31 NOTE — ASSESSMENT & PLAN NOTE
DVT left leg in 2010 secondary to cellulitis due to injury  Patient was on Coumadin for 1 year afterward    Plan  · Lovenox 40 mg subcutaneous daily

## 2021-02-01 ENCOUNTER — APPOINTMENT (INPATIENT)
Dept: RADIOLOGY | Facility: HOSPITAL | Age: 46
DRG: 720 | End: 2021-02-01
Payer: COMMERCIAL

## 2021-02-01 LAB
ANION GAP SERPL CALCULATED.3IONS-SCNC: 6 MMOL/L (ref 4–13)
BASOPHILS # BLD AUTO: 0.05 THOUSANDS/ΜL (ref 0–0.1)
BASOPHILS NFR BLD AUTO: 1 % (ref 0–1)
BUN SERPL-MCNC: 10 MG/DL (ref 5–25)
CALCIUM SERPL-MCNC: 9.1 MG/DL (ref 8.3–10.1)
CHLORIDE SERPL-SCNC: 105 MMOL/L (ref 100–108)
CO2 SERPL-SCNC: 28 MMOL/L (ref 21–32)
CREAT SERPL-MCNC: 0.75 MG/DL (ref 0.6–1.3)
EOSINOPHIL # BLD AUTO: 0.43 THOUSAND/ΜL (ref 0–0.61)
EOSINOPHIL NFR BLD AUTO: 5 % (ref 0–6)
ERYTHROCYTE [DISTWIDTH] IN BLOOD BY AUTOMATED COUNT: 13.4 % (ref 11.6–15.1)
GFR SERPL CREATININE-BSD FRML MDRD: 111 ML/MIN/1.73SQ M
GLUCOSE SERPL-MCNC: 88 MG/DL (ref 65–140)
HCT VFR BLD AUTO: 40 % (ref 36.5–49.3)
HGB BLD-MCNC: 13.2 G/DL (ref 12–17)
IMM GRANULOCYTES # BLD AUTO: 0.04 THOUSAND/UL (ref 0–0.2)
IMM GRANULOCYTES NFR BLD AUTO: 1 % (ref 0–2)
L PNEUMO1 AG UR QL IA.RAPID: NEGATIVE
LYMPHOCYTES # BLD AUTO: 1.45 THOUSANDS/ΜL (ref 0.6–4.47)
LYMPHOCYTES NFR BLD AUTO: 17 % (ref 14–44)
MCH RBC QN AUTO: 30.7 PG (ref 26.8–34.3)
MCHC RBC AUTO-ENTMCNC: 33 G/DL (ref 31.4–37.4)
MCV RBC AUTO: 93 FL (ref 82–98)
MONOCYTES # BLD AUTO: 0.9 THOUSAND/ΜL (ref 0.17–1.22)
MONOCYTES NFR BLD AUTO: 11 % (ref 4–12)
NEUTROPHILS # BLD AUTO: 5.71 THOUSANDS/ΜL (ref 1.85–7.62)
NEUTS SEG NFR BLD AUTO: 65 % (ref 43–75)
NRBC BLD AUTO-RTO: 0 /100 WBCS
PLATELET # BLD AUTO: 414 THOUSANDS/UL (ref 149–390)
PMV BLD AUTO: 9.6 FL (ref 8.9–12.7)
POTASSIUM SERPL-SCNC: 3.7 MMOL/L (ref 3.5–5.3)
RBC # BLD AUTO: 4.3 MILLION/UL (ref 3.88–5.62)
S PNEUM AG UR QL: NEGATIVE
SODIUM SERPL-SCNC: 139 MMOL/L (ref 136–145)
WBC # BLD AUTO: 8.58 THOUSAND/UL (ref 4.31–10.16)

## 2021-02-01 PROCEDURE — G1004 CDSM NDSC: HCPCS

## 2021-02-01 PROCEDURE — 80048 BASIC METABOLIC PNL TOTAL CA: CPT | Performed by: INTERNAL MEDICINE

## 2021-02-01 PROCEDURE — 71250 CT THORAX DX C-: CPT

## 2021-02-01 PROCEDURE — 99233 SBSQ HOSP IP/OBS HIGH 50: CPT | Performed by: INTERNAL MEDICINE

## 2021-02-01 PROCEDURE — 36569 INSJ PICC 5 YR+ W/O IMAGING: CPT

## 2021-02-01 PROCEDURE — 85025 COMPLETE CBC W/AUTO DIFF WBC: CPT | Performed by: INTERNAL MEDICINE

## 2021-02-01 PROCEDURE — 02HV33Z INSERTION OF INFUSION DEVICE INTO SUPERIOR VENA CAVA, PERCUTANEOUS APPROACH: ICD-10-PCS | Performed by: INTERNAL MEDICINE

## 2021-02-01 PROCEDURE — C1751 CATH, INF, PER/CENT/MIDLINE: HCPCS

## 2021-02-01 PROCEDURE — 71045 X-RAY EXAM CHEST 1 VIEW: CPT

## 2021-02-01 PROCEDURE — 99232 SBSQ HOSP IP/OBS MODERATE 35: CPT | Performed by: PHYSICIAN ASSISTANT

## 2021-02-01 RX ORDER — METRONIDAZOLE 500 MG/1
500 TABLET ORAL EVERY 8 HOURS SCHEDULED
Status: DISCONTINUED | OUTPATIENT
Start: 2021-02-01 | End: 2021-02-03 | Stop reason: HOSPADM

## 2021-02-01 RX ORDER — DOXYCYCLINE HYCLATE 100 MG/1
100 CAPSULE ORAL EVERY 12 HOURS SCHEDULED
Status: DISCONTINUED | OUTPATIENT
Start: 2021-02-01 | End: 2021-02-01

## 2021-02-01 RX ADMIN — CEFTRIAXONE SODIUM 2000 MG: 10 INJECTION, POWDER, FOR SOLUTION INTRAVENOUS at 11:35

## 2021-02-01 RX ADMIN — ACETAMINOPHEN 975 MG: 325 TABLET ORAL at 05:29

## 2021-02-01 RX ADMIN — METRONIDAZOLE 500 MG: 500 TABLET ORAL at 21:34

## 2021-02-01 RX ADMIN — ACETAMINOPHEN 975 MG: 325 TABLET ORAL at 14:17

## 2021-02-01 RX ADMIN — LEVOTHYROXINE SODIUM 25 MCG: 25 TABLET ORAL at 05:29

## 2021-02-01 RX ADMIN — ACETAMINOPHEN 975 MG: 325 TABLET ORAL at 21:34

## 2021-02-01 RX ADMIN — METRONIDAZOLE 500 MG: 500 TABLET ORAL at 11:39

## 2021-02-01 RX ADMIN — ENOXAPARIN SODIUM 40 MG: 40 INJECTION SUBCUTANEOUS at 08:35

## 2021-02-01 NOTE — PROGRESS NOTES
Progress Note -Interventional Radiology VADIM Yan wilmer 39 y o  male MRN: [id number]  Unit/Bed#: -01 Encounter: [id number]    Assessment:  39year old male presenting with loculated left sided pleural effusion, s/p IR 12Fr  left chest tube placed 1/30/21, TPA/pulmonase administered 1/31/21    Left chest tube output 24 hours: 2450cc serosanguinous, -AL    Plan:  - portable cxr to evaluate left pleural effusion  - if cxr clear and output remains low, will plan on removing chest tube today/tomorrow    Patient Active Problem List   Diagnosis    Pleural effusion, left    Acquired hypothyroidism    History of DVT (deep vein thrombosis)    Tobacco abuse    Sepsis ([facility] [address] )    Mild intermittent asthma          Subjective: Patient with severe chest pain after fluid removed yesterday  Feels much better today  On 1L O2 satting 95%  Feels much better after 2 5L fluid removed  Objective:    Vitals:  /90   Pulse 95   Temp 98 1 °F (36 7 °C)   Resp 18   Ht 6' (1 829 m)   Wt 111 kg (245 lb 6 oz)   SpO2 94%   BMI 33 28 kg/m²   Body mass index is 33 28 kg/m²    Weight (last 2 days)     Date/Time   Weight    02/01/21 0539   111 (245 37)    01/31/21 0535   111 (244 71)              I/Os:    Intake/Output Summary (Last 24 hours) at 2/1/2021 1141  Last data filed at 2/1/2021 0730  Gross per 24 hour   Intake 480 ml   Output 3400 ml   Net -2920 ml       Invasive Devices     Peripherally Inserted Central Catheter Line            PICC Line 09/38/24 Left Basilic less than 1 day          Peripheral Intravenous Line            Peripheral IV 01/29/21 Right Antecubital 2 days          Drain            Chest Tube 1 Left 12 Fr  1 day                Physical Exam:  General appearance: alert and oriented, in no acute distress  Lungs: clear to auscultation bilaterally  Chest wall: left chest tube insertion site c/d/i, mild tenderness to palpation near insertion site  Heart: regular rate and rhythm, S1, S2 normal, no murmur, click, rub or gallop  Abdomen: soft, non-tender; bowel sounds normal; no masses,  no organomegaly                Lab Results and Cultures:   CBC with diff:   Lab Results   Component Value Date    WBC 13 80 (H) 01/31/2021    HGB 15 2 01/31/2021    HCT 46 1 01/31/2021    MCV 94 01/31/2021     (H) 01/31/2021    MCH 31 0 01/31/2021    MCHC 33 0 01/31/2021    RDW 13 4 01/31/2021    MPV 9 2 01/31/2021    NRBC 0 01/31/2021      BMP/CMP:  Lab Results   Component Value Date    K 3 7 01/31/2021     01/31/2021    CO2 24 01/31/2021    BUN 10 01/31/2021    CREATININE 0 85 01/31/2021    CALCIUM 9 2 01/31/2021    AST 13 01/30/2021    ALT 19 01/30/2021    ALKPHOS 102 01/30/2021    EGFR 105 01/31/2021   ,     Coags:   Lab Results   Component Value Date    INR 1 04 01/30/2021   ,   Results from last 7 days   Lab Units 01/30/21  0558   INR  1 04        Lipid Panel: No results found for: CHOL  Lab Results   Component Value Date    HDL 29 (L) 01/30/2021     Lab Results   Component Value Date    HDL 29 (L) 01/30/2021     Lab Results   Component Value Date    LDLCALC 101 (H) 01/30/2021     Lab Results   Component Value Date    TRIG 86 01/30/2021       HgbA1c: No results found for: HGBA1C    Blood Culture:   Lab Results   Component Value Date    BLOODCX No Growth at 48 hrs  01/29/2021    BLOODCX No Growth at 48 hrs  01/29/2021   ,   Urinalysis:   Lab Results   Component Value Date    COLORU Michelle 01/30/2021    CLARITYU Clear 01/30/2021    SPECGRAV 1 015 01/30/2021    PHUR 6 5 01/30/2021    LEUKOCYTESUR Negative 01/30/2021    NITRITE Negative 01/30/2021    GLUCOSEU Negative 01/30/2021    KETONESU 40 (2+) (A) 01/30/2021    BILIRUBINUR Small (A) 01/30/2021    BLOODU Negative 01/30/2021   ,   Urine Culture: No results found for: URINECX,   Wound Culure:  No results found for: WOUNDCULT    VTE Pharmacologic Prophylaxis: Enoxaparin (Lovenox)      Thank you for allowing me to participate in the care of Gallito Reeves  Please don't hesitate to call, text, email, or TigerText with any questions  This text is generated with voice recognition software  There may be translation, syntax,  or grammatical errors  If you have any questions, please contact the dictating provider      [place] PA-C

## 2021-02-01 NOTE — PROCEDURES
Insert PICC line    Date/Time: 2/1/2021 10:07 AM  Performed by: Darryl Dumont RN  Authorized by: Denita Cole DO     Patient location:  Bedside  Other Assisting Provider: Yes (comment)    Consent:     Consent obtained:  Written (physician obtained consent)    Consent given by:  Patient    Procedural risks discussed: MD instructed  Universal protocol:     Procedure explained and questions answered to patient or proxy's satisfaction: yes      Relevant documents present and verified: yes      Test results available and properly labeled: yes      Radiology Images displayed and confirmed  If images not available, report reviewed: yes      Required blood products, implants, devices, and special equipment available: yes      Site/side marked: yes      Immediately prior to procedure, a time out was called: yes      Patient identity confirmed:  Verbally with patient and arm band  Pre-procedure details:     Hand hygiene: Hand hygiene performed prior to insertion      Sterile barrier technique: All elements of maximal sterile technique followed      Skin preparation:  ChloraPrep    Skin preparation agent: Skin preparation agent completely dried prior to procedure    Indications:     PICC line indications: no peripheral vascular access    Anesthesia (see MAR for exact dosages): Anesthesia method:  Local infiltration    Local anesthetic:  Lidocaine 1% w/o epi (2 ml)  Procedure details:     Location:  Basilic    Vessel type: vein      Laterality:  Left    Site selection rationale:  Right arm veins don't compress    Approach: percutaneous technique used      Patient position: 30 degree elevation      Procedural supplies:  Double lumen    Catheter size:  5 Fr    Landmarks identified: yes      Ultrasound guidance: yes      Sterile ultrasound techniques: Sterile gel and sterile probe covers were used      Number of attempts:  1    Successful placement: yes      Vessel of catheter tip end:  Sherlock 3CG confirmed (OK to use, reported to RN)    Total catheter length (cm):  51    Catheter out on skin (cm):  0    Max flow rate:  999 ml / hr    Arm circumference:  36  Post-procedure details:     Post-procedure:  Dressing applied and securement device placed    Assessment:  Blood return through all ports and free fluid flow    Post-procedure complications: none      Patient tolerance of procedure: Tolerated well, no immediate complications    Observer: Yes      Observer name:   Mauro Mishra, Infusion Tech

## 2021-02-01 NOTE — PROGRESS NOTES
INTERNAL MEDICINE RESIDENCY PROGRESS NOTE     Name: Tamica Mejia   Age & Sex: 39 y o  male   MRN: [id number]  Unit/Bed#: -01   Encounter: [id number]  Team: SOD Team A    PATIENT INFORMATION     Name: Tamica Mejia   Age & Sex: 39 y o  male   MRN: [id number]  Hospital Stay Days: 2    ASSESSMENT/PLAN     Principal Problem:    Pleural effusion, left  Active Problems:    Acquired hypothyroidism    History of DVT (deep vein thrombosis)    Tobacco abuse    Sepsis ([facility], [place] [address] )    Mild intermittent asthma      * Pleural effusion, left  Assessment & Plan  Left chest discomfort and shortness of breath started 1/23, per patient was started on Z-Mario 1/25 worsening shortness of breath and chest pain, went back to the ED, was treated with meds - not in the EMR - with temporary relief 1/29 patient went to the ED at [facility] x-ray chest and CT chest showing moderate-to-large loculated left pleural effusion and compressive atelectasis left lung  S/p chest tube  Patient's pleural effusion met criteria for exudative and likely a complicated infusion given loculation  Workup with atypical infection is negative  No positive blood cultures  tPA done on 1//31 by IR-->drained 2450 cc serosanguineous fluid in first 24 hours  · Stopping doxycycline  · Ceftriaxone--increasing from 1000 to 2000mg q25h (Day 3)   · Starting Metronidazole (Day 1 02/01)  · Follow-up cultures  · Follow-up repeat chest x-ray post tPA--> if effusion greatly decreased and less drain output, IR will likely remove tube  · Wean oxygen as tolerated  · Smoking cessation strongly encouraged  · Will need thoracic consultation if chest tube output does not improve      Mild intermittent asthma  Assessment & Plan  Hx of mild intermittent asthma  not on any bronchodilator    Plan   Albuterol inhaler prn - no evidence of wheezing today  Sepsis [facility])  Assessment & Plan  Present on admission secondary to pneumonia    No longer meets sepsis criteria  · Follow-up blood cultures  · Ceftriaxone and metronidazole for likely CAP    Tobacco abuse  Assessment & Plan  Smoking 1 pack a day since age 25 Quit since January 25 in context of shortness of breath and chest pain  Patient is motivated to quit  Plan  · Nicotine patch 21 mg daily    History of DVT (deep vein thrombosis)  Assessment & Plan  DVT left leg in 2010 secondary to cellulitis due to injury  Patient was on Coumadin for 1 year afterward    Plan  · Lovenox 40 mg subcutaneous daily    Acquired hypothyroidism  Assessment & Plan  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 16 s/p radioactive iodine  Patient not talking Levothyroxine for "Many" years and denies any related symptoms     Plan  · Levothyroxine 25 mg  Disposition:  Patient currently undergoing multidisciplinary treatment for complicated pleural effusion  Has a chest tube and will continue antibiotics  SUBJECTIVE     Patient seen and examined  No acute events overnight--however the nurse notes that the patient lost his IV access and will need a PICC line  Patient is feeling much better compared to yesterday  He is in much less pain and is breathing easier  Denies any other symptoms such as headache, dizziness/lightheadedness, chest pain, palpitations, cough, abdominal pain, N/V/D  He is still eating, but goes slow due to the pain; no issues with bowel or bladder movements  OBJECTIVE     Vitals:    01/31/21 0802 01/31/21 1538 02/01/21 0539 02/01/21 0748   BP: 132/60 121/84  139/90   Pulse:  (!) 113  95   Resp:  19  18   Temp:       TempSrc:       SpO2:  93%  94%   Weight:   111 kg (245 lb 6 oz)    Height:          Temperature:   No data recorded  Temperature: 98 1 °F (36 7 °C)  Intake & Output:  I/O       01/30 0701 - 01/31 0700 01/31 0701 - 02/01 0700 02/01 0701 - 02/02 0700    P  O   180 480    Chest Tube  30     Total Intake(mL/kg)  210 (1 9) 480 (4 3)    Urine (mL/kg/hr)  950 (0 4)     Chest Tube 28 2450     Total Output 28 3400 Net -28 -3190 +480               Weights:   IBW: 77 6 kg    Body mass index is 33 28 kg/m²  Weight (last 2 days)     Date/Time   Weight    02/01/21 0539   111 (245 37)    01/31/21 0535   111 (244 71)            Physical Exam  Vitals signs reviewed  Constitutional:       General: He is not in acute distress  Appearance: He is well-developed  He is not diaphoretic  HENT:      Head: Normocephalic and atraumatic  Eyes:      General: No scleral icterus  Conjunctiva/sclera: Conjunctivae normal    Neck:      Musculoskeletal: Neck supple  Thyroid: No thyromegaly  Trachea: No tracheal deviation  Cardiovascular:      Rate and Rhythm: Normal rate and regular rhythm  Heart sounds: Normal heart sounds  No murmur  No gallop  Pulmonary:      Effort: Pulmonary effort is normal  No tachypnea or accessory muscle usage  Breath sounds: Normal air entry  Examination of the left-middle field reveals decreased breath sounds  Examination of the left-lower field reveals decreased breath sounds  Decreased breath sounds present  No wheezing, rhonchi or rales  Abdominal:      General: Bowel sounds are normal  There is distension (slightly, but not tight--easily compressible)  Palpations: Abdomen is soft  Tenderness: There is abdominal tenderness (LUQ, especially along costal margin)  Musculoskeletal:         General: No tenderness  Right lower leg: No edema  Left lower leg: No edema  Lymphadenopathy:      Cervical: No cervical adenopathy  Skin:     General: Skin is warm  Coloration: Skin is not pale  Findings: No erythema  Neurological:      General: No focal deficit present  Mental Status: He is alert and oriented to person, place, and time  Psychiatric:         Mood and Affect: Mood normal          Behavior: Behavior normal          Thought Content: Thought content normal          Judgment: Judgment normal        LABORATORY DATA     Labs:  I have personally reviewed pertinent reports  Results from last 7 days   Lab Units 02/01/21  1123 01/31/21  1451 01/31/21  0534   WBC Thousand/uL 8 58 13 80* 9 57   HEMOGLOBIN g/dL 13 2 15 2 12 4   HEMATOCRIT % 40 0 46 1 37 9   PLATELETS Thousands/uL 414* 438* 378   NEUTROS PCT % 65 80* 69   MONOS PCT % 11 10 12      Results from last 7 days   Lab Units 02/01/21  1123 01/31/21  0534 01/30/21  0558 01/29/21  1654   POTASSIUM mmol/L 3 7 3 7 3 7 3 5   CHLORIDE mmol/L 105 106 101 97*   CO2 mmol/L 28 24 18* 24   BUN mg/dL 10 10 12 15   CREATININE mg/dL 0 75 0 85 0 88 1 16   CALCIUM mg/dL 9 1 9 2 8 3 9 3   ALK PHOS U/L  --   --  102 119*   ALT U/L  --   --  19 24   AST U/L  --   --  13 13     Results from last 7 days   Lab Units 01/30/21  0558   MAGNESIUM mg/dL 1 8     Results from last 7 days   Lab Units 01/30/21  0558   PHOSPHORUS mg/dL 2 4*      Results from last 7 days   Lab Units 01/30/21  0558   INR  1 04     Results from last 7 days   Lab Units 01/29/21  1654   LACTIC ACID mmol/L 1 1     Results from last 7 days   Lab Units 01/31/21  1207 01/29/21  1839   TROPONIN I ng/mL <0 02 <0 02       IMAGING & DIAGNOSTIC TESTING     Radiology Results: I have personally reviewed pertinent reports  Xr Chest Portable    Result Date: 1/31/2021  Impression: Decreased left pleural effusion with subjacent compressive atelectasis  Workstation performed: [id number]     Ir Chest Tube Placement    Result Date: 2/1/2021  Impression: Impression: 1  Successful placement of a 12 [place] catheter into the left pleural space under ultrasound guidance, and 30 cc of cloudy yellow fluid was aspirated and a specimen sent to the lab as described above  2  Plan for TPA/dornase tomorrow  Workstation performed: [id number]     Other Diagnostic Testing: I have personally reviewed pertinent reports      ACTIVE MEDICATIONS     Current Facility-Administered Medications   Medication Dose Route Frequency    acetaminophen (TYLENOL) tablet 975 mg  975 mg Oral Q8H [address]    albuterol (PROVENTIL HFA,VENTOLIN HFA) inhaler 2 puff  2 puff Inhalation Q4H PRN    cefTRIAXone (ROCEPHIN) 2,000 mg in dextrose 5 % 50 mL IVPB  2,000 mg Intravenous Q24H    docusate sodium (COLACE) capsule 100 mg  100 mg Oral BID PRN    enoxaparin (LOVENOX) subcutaneous injection 40 mg  40 mg Subcutaneous Q24H GARRET    HYDROmorphone (DILAUDID) injection 0 5 mg  0 5 mg Intravenous Q4H PRN    HYDROmorphone (DILAUDID) injection 1 mg  1 mg Intravenous Q4H PRN    levothyroxine tablet 25 mcg  25 mcg Oral Early Morning    metroNIDAZOLE (FLAGYL) tablet 500 mg  500 mg Oral Q8H [address]    nicotine (NICODERM CQ) 21 mg/24 hr TD 24 hr patch 21 mg  21 mg Transdermal Daily    ondansetron (ZOFRAN) injection 4 mg  4 mg Intravenous Q4H PRN       VTE Pharmacologic Prophylaxis: Enoxaparin (Lovenox)  VTE Mechanical Prophylaxis: sequential compression device    Portions of the record may have been created with voice recognition software  Occasional wrong word or "sound a like" substitutions may have occurred due to the inherent limitations of voice recognition software    Read the chart carefully and recognize, using context, where substitutions have occurred   ==  Marsha Krause DO  [address]  Internal Medicine Residency PGY-1

## 2021-02-02 PROBLEM — R93.89 ABNORMAL FINDING ON CT SCAN: Status: ACTIVE | Noted: 2021-02-02

## 2021-02-02 LAB
ANION GAP SERPL CALCULATED.3IONS-SCNC: 7 MMOL/L (ref 4–13)
ATRIAL RATE: 117 BPM
ATRIAL RATE: 132 BPM
BASOPHILS # BLD AUTO: 0.03 THOUSANDS/ΜL (ref 0–0.1)
BASOPHILS NFR BLD AUTO: 0 % (ref 0–1)
BUN SERPL-MCNC: 8 MG/DL (ref 5–25)
CALCIUM SERPL-MCNC: 8.7 MG/DL (ref 8.3–10.1)
CHLORIDE SERPL-SCNC: 108 MMOL/L (ref 100–108)
CO2 SERPL-SCNC: 27 MMOL/L (ref 21–32)
CREAT SERPL-MCNC: 0.68 MG/DL (ref 0.6–1.3)
EOSINOPHIL # BLD AUTO: 0.52 THOUSAND/ΜL (ref 0–0.61)
EOSINOPHIL NFR BLD AUTO: 7 % (ref 0–6)
ERYTHROCYTE [DISTWIDTH] IN BLOOD BY AUTOMATED COUNT: 13.2 % (ref 11.6–15.1)
GFR SERPL CREATININE-BSD FRML MDRD: 115 ML/MIN/1.73SQ M
GLUCOSE SERPL-MCNC: 88 MG/DL (ref 65–140)
HCT VFR BLD AUTO: 37.7 % (ref 36.5–49.3)
HGB BLD-MCNC: 12.6 G/DL (ref 12–17)
IMM GRANULOCYTES # BLD AUTO: 0.03 THOUSAND/UL (ref 0–0.2)
IMM GRANULOCYTES NFR BLD AUTO: 0 % (ref 0–2)
LYMPHOCYTES # BLD AUTO: 1.5 THOUSANDS/ΜL (ref 0.6–4.47)
LYMPHOCYTES NFR BLD AUTO: 20 % (ref 14–44)
MCH RBC QN AUTO: 31 PG (ref 26.8–34.3)
MCHC RBC AUTO-ENTMCNC: 33.4 G/DL (ref 31.4–37.4)
MCV RBC AUTO: 93 FL (ref 82–98)
MONOCYTES # BLD AUTO: 0.81 THOUSAND/ΜL (ref 0.17–1.22)
MONOCYTES NFR BLD AUTO: 11 % (ref 4–12)
NEUTROPHILS # BLD AUTO: 4.67 THOUSANDS/ΜL (ref 1.85–7.62)
NEUTS SEG NFR BLD AUTO: 62 % (ref 43–75)
NRBC BLD AUTO-RTO: 0 /100 WBCS
P AXIS: 33 DEGREES
P AXIS: 39 DEGREES
PLATELET # BLD AUTO: 378 THOUSANDS/UL (ref 149–390)
PMV BLD AUTO: 9.3 FL (ref 8.9–12.7)
POTASSIUM SERPL-SCNC: 3.4 MMOL/L (ref 3.5–5.3)
PR INTERVAL: 138 MS
PR INTERVAL: 170 MS
QRS AXIS: 11 DEGREES
QRS AXIS: 14 DEGREES
QRSD INTERVAL: 114 MS
QRSD INTERVAL: 114 MS
QT INTERVAL: 306 MS
QT INTERVAL: 326 MS
QTC INTERVAL: 453 MS
QTC INTERVAL: 454 MS
RBC # BLD AUTO: 4.06 MILLION/UL (ref 3.88–5.62)
SODIUM SERPL-SCNC: 142 MMOL/L (ref 136–145)
T WAVE AXIS: 106 DEGREES
T WAVE AXIS: 68 DEGREES
VENTRICULAR RATE: 117 BPM
VENTRICULAR RATE: 132 BPM
WBC # BLD AUTO: 7.56 THOUSAND/UL (ref 4.31–10.16)

## 2021-02-02 PROCEDURE — 99233 SBSQ HOSP IP/OBS HIGH 50: CPT | Performed by: INTERNAL MEDICINE

## 2021-02-02 PROCEDURE — 99231 SBSQ HOSP IP/OBS SF/LOW 25: CPT | Performed by: PHYSICIAN ASSISTANT

## 2021-02-02 PROCEDURE — 93010 ELECTROCARDIOGRAM REPORT: CPT | Performed by: INTERNAL MEDICINE

## 2021-02-02 PROCEDURE — 80048 BASIC METABOLIC PNL TOTAL CA: CPT | Performed by: STUDENT IN AN ORGANIZED HEALTH CARE EDUCATION/TRAINING PROGRAM

## 2021-02-02 PROCEDURE — 85025 COMPLETE CBC W/AUTO DIFF WBC: CPT | Performed by: STUDENT IN AN ORGANIZED HEALTH CARE EDUCATION/TRAINING PROGRAM

## 2021-02-02 RX ORDER — POTASSIUM CHLORIDE 29.8 MG/ML
40 INJECTION INTRAVENOUS ONCE
Status: COMPLETED | OUTPATIENT
Start: 2021-02-02 | End: 2021-02-02

## 2021-02-02 RX ADMIN — POTASSIUM CHLORIDE 40 MEQ: 29.8 INJECTION, SOLUTION INTRAVENOUS at 08:10

## 2021-02-02 RX ADMIN — ACETAMINOPHEN 975 MG: 325 TABLET ORAL at 22:16

## 2021-02-02 RX ADMIN — ACETAMINOPHEN 975 MG: 325 TABLET ORAL at 14:44

## 2021-02-02 RX ADMIN — ACETAMINOPHEN 975 MG: 325 TABLET ORAL at 05:16

## 2021-02-02 RX ADMIN — LEVOTHYROXINE SODIUM 25 MCG: 25 TABLET ORAL at 05:16

## 2021-02-02 RX ADMIN — METRONIDAZOLE 500 MG: 500 TABLET ORAL at 05:16

## 2021-02-02 RX ADMIN — ENOXAPARIN SODIUM 40 MG: 40 INJECTION SUBCUTANEOUS at 08:14

## 2021-02-02 RX ADMIN — METRONIDAZOLE 500 MG: 500 TABLET ORAL at 14:44

## 2021-02-02 RX ADMIN — NICOTINE 21 MG: 21 PATCH, EXTENDED RELEASE TRANSDERMAL at 08:14

## 2021-02-02 RX ADMIN — METRONIDAZOLE 500 MG: 500 TABLET ORAL at 22:16

## 2021-02-02 RX ADMIN — CEFTRIAXONE SODIUM 2000 MG: 10 INJECTION, POWDER, FOR SOLUTION INTRAVENOUS at 11:36

## 2021-02-02 NOTE — PROGRESS NOTES
Progress Note -Interventional Radiology VADIM Parham Katrin 39 y o  male MRN: [id number]  Unit/Bed#: -01 Encounter: [id number]    Assessment:    39year old male presenting with loculated left sided pleural effusion, s/p IR 12Fr  left chest tube placed 1/30/21, TPA/pulmonase administered 1/31/21, CT chest 2/1/21 with improvement of left pleural effusion    Plan:    - chest tube removed at bedside  - appreciate internal medicine input  - f/u final cultures  Currently no growth  - keep gauze in place for 24 hours  May remove after 24 hours  Replace with bandaid  Patient Active Problem List   Diagnosis    Pleural effusion, left    Acquired hypothyroidism    History of DVT (deep vein thrombosis)    Tobacco abuse    Sepsis ([facility] [address] )    Mild intermittent asthma          Subjective: Patient with no complaints overnight  Stats breathing is better  Currently on RA    Objective:    Vitals:  /78 (BP Location: Right arm)   Pulse 84   Temp 98 8 °F (37 1 °C) (Oral)   Resp 18   Ht 6' (1 829 m)   Wt 114 kg (251 lb 12 3 oz)   SpO2 95%   BMI 34 15 kg/m²   Body mass index is 34 15 kg/m²    Weight (last 2 days)     Date/Time   Weight    02/02/21 0600   114 (251 77)    02/02/21 0516   114 (251 77)    02/01/21 0539   111 (245 37)    01/31/21 0535   111 (244 71)              I/Os:    Intake/Output Summary (Last 24 hours) at 2/2/2021 0933  Last data filed at 2/1/2021 1730  Gross per 24 hour   Intake 450 ml   Output 15 ml   Net 435 ml       Invasive Devices     Peripherally Inserted Central Catheter Line            PICC Line 88/08/94 Left Basilic less than 1 day          Drain            Chest Tube 1 Left 12 Fr  2 days                Physical Exam:  General appearance: alert and oriented, in no acute distress  Lungs: clear to auscultation bilaterally  Chest wall: left sided posterior chest c/d/i, gauze applied to chest tube insertion site  Heart: regular rate and rhythm, S1, S2 normal, no murmur, click, rub or gallop  Abdomen: soft, non-tender; bowel sounds normal; no masses,  no organomegaly                Lab Results and Cultures:   CBC with diff:   Lab Results   Component Value Date    WBC 7 56 02/02/2021    HGB 12 6 02/02/2021    HCT 37 7 02/02/2021    MCV 93 02/02/2021     02/02/2021    MCH 31 0 02/02/2021    MCHC 33 4 02/02/2021    RDW 13 2 02/02/2021    MPV 9 3 02/02/2021    NRBC 0 02/02/2021      BMP/CMP:  Lab Results   Component Value Date    K 3 4 (L) 02/02/2021     02/02/2021    CO2 27 02/02/2021    BUN 8 02/02/2021    CREATININE 0 68 02/02/2021    CALCIUM 8 7 02/02/2021    AST 13 01/30/2021    ALT 19 01/30/2021    ALKPHOS 102 01/30/2021    EGFR 115 02/02/2021   ,     Coags:   Lab Results   Component Value Date    INR 1 04 01/30/2021   ,   Results from last 7 days   Lab Units 01/30/21  0558   INR  1 04        Lipid Panel: No results found for: CHOL  Lab Results   Component Value Date    HDL 29 (L) 01/30/2021     Lab Results   Component Value Date    HDL 29 (L) 01/30/2021     Lab Results   Component Value Date    LDLCALC 101 (H) 01/30/2021     Lab Results   Component Value Date    TRIG 86 01/30/2021       HgbA1c: No results found for: HGBA1C    Blood Culture:   Lab Results   Component Value Date    BLOODCX No Growth at 72 hrs  01/29/2021    BLOODCX No Growth at 72 hrs  01/29/2021   ,   Urinalysis:   Lab Results   Component Value Date    COLORU Michelle 01/30/2021    CLARITYU Clear 01/30/2021    SPECGRAV 1 015 01/30/2021    PHUR 6 5 01/30/2021    LEUKOCYTESUR Negative 01/30/2021    NITRITE Negative 01/30/2021    GLUCOSEU Negative 01/30/2021    KETONESU 40 (2+) (A) 01/30/2021    BILIRUBINUR Small (A) 01/30/2021    BLOODU Negative 01/30/2021   ,   Urine Culture: No results found for: URINECX,   Wound Culure:  No results found for: WOUNDCULT    VTE Pharmacologic Prophylaxis: Enoxaparin (Lovenox)      Thank you for allowing me to participate in the care of Jason Black  Please don't hesitate to call, text, email, or [employer] with any questions  This text is generated with voice recognition software  There may be translation, syntax,  or grammatical errors  If you have any questions, please contact the dictating provider      Kadeem Lujan PA-C

## 2021-02-02 NOTE — CASE MANAGEMENT
LOS 3 Days  Not a Bundle  Not a Readmission- Tx form [place]  Unplanned Readmission Risk is 9    CM spoke with pt to discuss role of CM and d/c planning  Pt lives with his wife and 2 children in a 3sh, 2 ana cristina to enter, on flight of stairs to second floor and one flight to the basement  Pt reports being IPTA, does not use DME  Denies hx of VNA, HHC  Pt denies hx of rehab needs IP or OP  Denies MH or D&A  Primary Contact: Derek Orthodoxy (spouse) [phone]  No POA/LW  Family to transport when stable    CM reviewed d/c planning process including the following: identifying help at home, patient preference for d/c planning needs, Discharge Lounge, [facility] Meds to Bed program, availability of treatment team to discuss questions or concerns patient and/or family may have regarding understanding medications and recognizing signs and symptoms once discharged  CM also encouraged patient to follow up with all recommended appointments after discharge  Patient advised of importance for patient and family to participate in managing patients medical well being

## 2021-02-02 NOTE — INCIDENTAL FINDINGS
The following findings require follow up:  Radiographic finding   Finding: CT chest wo contrast: 1  Status post left chest tube placement  Decreased left pleural effusion with small pleural effusion remaining  Small left hydropneumothorax , 2  Improved aeration of the left lung field , 3   Soft tissue thickening/fat infiltration at the anterior mediastinum bordering the left pleural margin  This is nonspecific and while this could be inflammatory, underlying developing neoplastic process is not excluded  Continued imaging follow-up is , advised  A short-term follow-up contrast chest CT could be performed in 2-3 months versus FDG PET CT , The study was marked in EPIC for significant notification  ,    Follow up required: CT chest   Follow up should be done within 2 months     Please notify the following clinician to assist with the follow up:   Patients PCP -currently does not have one

## 2021-02-02 NOTE — PROGRESS NOTES
INTERNAL MEDICINE RESIDENCY PROGRESS NOTE     Name: Selene Blum   Age & Sex: 39 y o  male   MRN: [id number]  Unit/Bed#: -01   Encounter: [id number]  Team: SOD Team A    PATIENT INFORMATION     Name: Selene Blum   Age & Sex: 39 y o  male   MRN: [id number]  Hospital Stay Days: 3    ASSESSMENT/PLAN     Principal Problem:    Pleural effusion, left  Active Problems:    Acquired hypothyroidism    History of DVT (deep vein thrombosis)    Tobacco abuse    Sepsis ([address] )    Mild intermittent asthma    Abnormal finding on CT scan      * Pleural effusion, left  Assessment & Plan  Left chest discomfort and shortness of breath started 1/23, per patient was started on Z-Mario 1/25 worsening shortness of breath and chest pain, went back to the ED, was treated with meds - not in the EMR - with temporary relief 1/29 patient went to the ED at [facility] x-ray chest and CT chest showing moderate-to-large loculated left pleural effusion and compressive atelectasis left lung  S/p chest tube  Patient's pleural effusion met criteria for exudative and likely a complicated infusion given loculation  Workup with atypical infection is negative  No positive blood cultures  tPA done on 1//31 by IR-->drained 2450 cc serosanguineous fluid in first 24 hours  Lost IV access 2/1--> PICC placed same day    Post tPA imaging:   CXR 2/1 showed ground-glass opacity in the left lung but no definite effusion  CT 2/1 showed decreased left pleural effusion remaining, small left hydropneumothorax, improved left lung aeration, and soft tissue thickening/fat infiltration at anterior mediastinum bordering left pleural margin  · Ceftriaxone--2000mg q25h (Day 4)   · Starting Metronidazole (Day 2) for anaerobic coverage  · Follow-up cultures  · IR removed drain 2/2-->will observe for 24 hours  · Wean oxygen as tolerated  · Smoking cessation strongly encouraged    Outpatient plan:  · Will need extended 2-3 weeks of antibiotics; likely will be eligible for Augmentin but will double check with ID or pharmacy  · Recommend patient use [employer] to search for local PCP to establish care    Abnormal finding on CT scan  Assessment & Plan  One likely malignancy given patient's age and nonspecific finding on CT  Outpatient plan:    · Recommend patient use [employer] to search for local PCP to establish care  · Repeat CT scan in 2-3 months    Mild intermittent asthma  Assessment & Plan  Hx of mild intermittent asthma  not on any bronchodilator    Plan   Albuterol inhaler prn - no evidence of wheezing today  Sepsis [facility])  Assessment & Plan  Present on admission secondary to pneumonia  No longer meets sepsis criteria  · Follow-up blood cultures  · Ceftriaxone and metronidazole for likely CAP    Tobacco abuse  Assessment & Plan  Smoking 1 pack a day since age 25 Quit since January 25 in context of shortness of breath and chest pain  Patient is motivated to quit  Plan  · Nicotine patch 21 mg daily    Outpatient plan:  · Continue nicotine patch and add on a gum or lozenges for shorter acting control  · Recommend patient use [employer] to search for local PCP to establish care    History of DVT (deep vein thrombosis)  Assessment & Plan  DVT left leg in 2010 secondary to cellulitis due to injury  Patient was on Coumadin for 1 year afterward    Plan  · Lovenox 40 mg subcutaneous daily    Acquired hypothyroidism  Assessment & Plan  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 16 s/p radioactive iodine  Patient not talking Levothyroxine for "Many" years and denies any related symptoms     Plan  · Levothyroxine 25 mg  Disposition:  Patient undergoing a multidisciplinary treatment for left complicated parapneumonic pleural effusion likely secondary to CAP  Chest tube was removed today and patient will be on antibiotics for another 2-3 weeks  Holding another 24 hours to monitor prior chest tube site  Likely discharge tomorrow  SUBJECTIVE     Patient seen and examined   No acute events overnight  Patient states he is feeling much better this morning and that "this is the 1st time (he) feels like himself"  Breathing is easier, and movements that cause him pain on his left side is no longer bother him  He remarks he has more energy and is committed to becoming stronger with physical therapy  Although he experienced some lightheadedness and weak LE with movement, we explained that this is normal given his infection  Patient feels ill finally have a bowel movement today  He is eating and drinking well, no more lateral movements  Denies fever, malaise, chest pain, palpitations, cough, abdominal pain, N/V/D  He is also still very clear with his smoking cessation and counseled him on what to expect  OBJECTIVE     Vitals:    21 2128 21 0516 21 0621 0729   BP: 137/89   134/78   BP Location: Right arm   Right arm   Pulse: 91   84   Resp: 16   18   Temp: 98 8 °F (37 1 °C)   98 8 °F (37 1 °C)   TempSrc: Oral   Oral   SpO2: 96%   95%   Weight:  114 kg (251 lb 12 3 oz) 114 kg (251 lb 12 3 oz)    Height:          Temperature:   Temp (24hrs), Av 8 °F (37 1 °C), Min:98 8 °F (37 1 °C), Max:98 8 °F (37 1 °C)    Temperature: 98 8 °F (37 1 °C)  Intake & Output:  I/O       701 -  0700 701 -  07 07 -  0700    P  O  180 930     Chest Tube 30      Total Intake(mL/kg) 210 (1 9) 930 (8 2)     Urine (mL/kg/hr) 950 (0 4)      Chest Tube 2450 15     Total Output 3400 15     Net -3190 +915                Weights:   IBW: 77 6 kg    Body mass index is 34 15 kg/m²  Weight (last 2 days)     Date/Time   Weight    21 0600   114 (251 77)    21 0516   114 (251 77)    21 0539   111 (245 37)    21 0535   111 (244 71)            Physical Exam  Vitals signs reviewed  Constitutional:       General: He is not in acute distress  Appearance: He is well-developed  He is not diaphoretic     HENT:      Head: Normocephalic and atraumatic  Eyes:      General: No scleral icterus  Conjunctiva/sclera: Conjunctivae normal    Neck:      Musculoskeletal: Neck supple  Thyroid: No thyromegaly  Trachea: No tracheal deviation  Cardiovascular:      Rate and Rhythm: Normal rate and regular rhythm  Heart sounds: Normal heart sounds  Pulmonary:      Effort: Pulmonary effort is normal  No tachypnea or accessory muscle usage  Breath sounds: Examination of the left-lower field reveals decreased breath sounds  Decreased breath sounds present  No wheezing, rhonchi or rales  Comments: Still decreased breath sounds in left lower lobe, but much improved with better aeration throughout  Abdominal:      General: Bowel sounds are normal  There is distension (but soft, still has give; more accurate to call "bloating")  Palpations: Abdomen is soft  Tenderness: There is no abdominal tenderness  Musculoskeletal:         General: No tenderness  Lymphadenopathy:      Cervical: No cervical adenopathy  Skin:     General: Skin is warm  Coloration: Skin is not pale  Findings: No erythema  Neurological:      Mental Status: He is alert and oriented to person, place, and time  Psychiatric:         Behavior: Behavior normal          Thought Content: Thought content normal          Judgment: Judgment normal        LABORATORY DATA     Labs: I have personally reviewed pertinent reports    Results from last 7 days   Lab Units 02/02/21  0430 02/01/21  1123 01/31/21  1451   WBC Thousand/uL 7 56 8 58 13 80*   HEMOGLOBIN g/dL 12 6 13 2 15 2   HEMATOCRIT % 37 7 40 0 46 1   PLATELETS Thousands/uL 378 414* 438*   NEUTROS PCT % 62 65 80*   MONOS PCT % 11 11 10      Results from last 7 days   Lab Units 02/02/21  0430 02/01/21  1123 01/31/21  0534 01/30/21  0558 01/29/21  1654   POTASSIUM mmol/L 3 4* 3 7 3 7 3 7 3 5   CHLORIDE mmol/L 108 105 106 101 97*   CO2 mmol/L 27 28 24 18* 24   BUN mg/dL 8 10 10 12 15   CREATININE mg/dL 0  68 0 75 0 85 0 88 1 16   CALCIUM mg/dL 8 7 9 1 9 2 8 3 9 3   ALK PHOS U/L  --   --   --  102 119*   ALT U/L  --   --   --  19 24   AST U/L  --   --   --  13 13     Results from last 7 days   Lab Units 01/30/21  0558   MAGNESIUM mg/dL 1 8     Results from last 7 days   Lab Units 01/30/21  0558   PHOSPHORUS mg/dL 2 4*      Results from last 7 days   Lab Units 01/30/21  0558   INR  1 04     Results from last 7 days   Lab Units 01/29/21  1654   LACTIC ACID mmol/L 1 1     Results from last 7 days   Lab Units 01/31/21  1207 01/29/21  1839   TROPONIN I ng/mL <0 02 <0 02       IMAGING & DIAGNOSTIC TESTING     Radiology Results: I have personally reviewed pertinent reports  Xr Chest Portable    Result Date: 2/1/2021  Impression: Pigtail catheter over medial left base with no definite effusion or pneumothorax  Persistent groundglass opacity throughout the left lung  Workstation performed: [id number]     Xr Chest Portable    Result Date: 1/31/2021  Impression: Decreased left pleural effusion with subjacent compressive atelectasis  Workstation performed: [id number]     Ct Chest Wo Contrast    Result Date: 2/1/2021  Impression: 1  Status post left chest tube placement  Decreased left pleural effusion with small pleural effusion remaining  Small left hydropneumothorax  2  Improved aeration of the left lung field  3   Soft tissue thickening/fat infiltration at the anterior mediastinum bordering the left pleural margin  This is nonspecific and while this could be inflammatory, underlying developing neoplastic process is not excluded  Continued imaging follow-up is advised  A short-term follow-up contrast chest CT could be performed in 2-3 months versus FDG PET CT  The study was marked in EPIC for significant notification  Workstation performed: [id number]     Ir Chest Tube Placement    Result Date: 2/1/2021  Impression: Impression: 1    Successful placement of a 12 [place] catheter into the left pleural space under ultrasound guidance, and 30 cc of cloudy yellow fluid was aspirated and a specimen sent to the lab as described above  2  Plan for TPA/dornase tomorrow  Workstation performed: [id number]     Other Diagnostic Testing: I have personally reviewed pertinent reports  ACTIVE MEDICATIONS     Current Facility-Administered Medications   Medication Dose Route Frequency    acetaminophen (TYLENOL) tablet 975 mg  975 mg Oral Q8H [address]    albuterol (PROVENTIL HFA,VENTOLIN HFA) inhaler 2 puff  2 puff Inhalation Q4H PRN    cefTRIAXone (ROCEPHIN) 2,000 mg in dextrose 5 % 50 mL IVPB  2,000 mg Intravenous Q24H    docusate sodium (COLACE) capsule 100 mg  100 mg Oral BID PRN    enoxaparin (LOVENOX) subcutaneous injection 40 mg  40 mg Subcutaneous Q24H [place]ARRET    HYDROmorphone (DILAUDID) injection 0 5 mg  0 5 mg Intravenous Q4H PRN    HYDROmorphone (DILAUDID) injection 1 mg  1 mg Intravenous Q4H PRN    levothyroxine tablet 25 mcg  25 mcg Oral Early Morning    metroNIDAZOLE (FLAGYL) tablet 500 mg  500 mg Oral Q8H [address]    nicotine (NICODERM CQ) 21 mg/24 hr TD 24 hr patch 21 mg  21 mg Transdermal Daily    ondansetron (ZOFRAN) injection 4 mg  4 mg Intravenous Q4H PRN       VTE Pharmacologic Prophylaxis: Enoxaparin (Lovenox)  VTE Mechanical Prophylaxis: sequential compression device    Portions of the record may have been created with voice recognition software  Occasional wrong word or "sound a like" substitutions may have occurred due to the inherent limitations of voice recognition software    Read the chart carefully and recognize, using context, where substitutions have occurred   ==  Edith Casarez, DO  [address]  Internal Medicine Residency PGY-1

## 2021-02-02 NOTE — PLAN OF CARE
Problem: Potential for Falls  Goal: Patient will remain free of falls  Description: INTERVENTIONS:  - Assess patient frequently for physical needs  -  Identify cognitive and physical deficits and behaviors that affect risk of falls    -  [place] fall precautions as indicated by assessment   - Educate patient/family on patient safety including physical limitations  - Instruct patient to call for assistance with activity based on assessment  - Modify environment to reduce risk of injury  - Consider OT/PT consult to assist with strengthening/mobility  Outcome: Progressing     Problem: PAIN - ADULT  Goal: Verbalizes/displays adequate comfort level or baseline comfort level  Description: Interventions:  - Encourage patient to monitor pain and request assistance  - Assess pain using appropriate pain scale  - Administer analgesics based on type and severity of pain and evaluate response  - Implement non-pharmacological measures as appropriate and evaluate response  - Consider cultural and social influences on pain and pain management  - Notify physician/advanced practitioner if interventions unsuccessful or patient reports new pain  Outcome: Progressing     Problem: INFECTION - ADULT  Goal: Absence or prevention of progression during hospitalization  Description: INTERVENTIONS:  - Assess and monitor for signs and symptoms of infection  - Monitor lab/diagnostic results  - Monitor all insertion sites, i e  indwelling lines, tubes, and drains  - Monitor endotracheal if appropriate and nasal secretions for changes in amount and color  - [place] appropriate cooling/warming therapies per order  - Administer medications as ordered  - Instruct and encourage patient and family to use good hand hygiene technique  - Identify and instruct in appropriate isolation precautions for identified infection/condition  Outcome: Progressing  Goal: Absence of fever/infection during neutropenic period  Description: INTERVENTIONS:  - Monitor WBC    Outcome: Progressing     Problem: SAFETY ADULT  Goal: Patient will remain free of falls  Description: INTERVENTIONS:  - Assess patient frequently for physical needs  -  Identify cognitive and physical deficits and behaviors that affect risk of falls  -  [place] fall precautions as indicated by assessment   - Educate patient/family on patient safety including physical limitations  - Instruct patient to call for assistance with activity based on assessment  - Modify environment to reduce risk of injury  - Consider OT/PT consult to assist with strengthening/mobility  Outcome: Progressing  Goal: Maintain or return to baseline ADL function  Description: INTERVENTIONS:  - Assess patient frequently for physical needs  -  Identify cognitive and physical deficits and behaviors that affect risk of falls    -  [place] fall precautions as indicated by assessment   - Educate patient/family on patient safety including physical limitations  - Instruct patient to call for assistance with activity based on assessment  - Modify environment to reduce risk of injury  - Consider OT/PT consult to assist with strengthening/mobility  Outcome: Progressing  Goal: Maintain or return mobility status to optimal level  Description: INTERVENTIONS:  -  Assess patient's ability to carry out ADLs; assess patient's baseline for ADL function and identify physical deficits which impact ability to perform ADLs (bathing, care of mouth/teeth, toileting, grooming, dressing, etc )  - Assess/evaluate cause of self-care deficits   - Assess range of motion  - Assess patient's mobility; develop plan if impaired  - Assess patient's need for assistive devices and provide as appropriate  - Encourage maximum independence but intervene and supervise when necessary  - Involve family in performance of ADLs  - Assess for home care needs following discharge   - Consider OT consult to assist with ADL evaluation and planning for discharge  - Provide patient education as appropriate  Outcome: Progressing     Problem: DISCHARGE PLANNING  Goal: Discharge to home or other facility with appropriate resources  Description: INTERVENTIONS:  - Identify barriers to discharge w/patient and caregiver  - Arrange for needed discharge resources and transportation as appropriate  - Identify discharge learning needs (meds, wound care, etc )  - Arrange for interpretive services to assist at discharge as needed  - Refer to Case Management Department for coordinating discharge planning if the patient needs post-hospital services based on physician/advanced practitioner order or complex needs related to functional status, cognitive ability, or social support system  Outcome: Progressing     Problem: Knowledge Deficit  Goal: Patient/family/caregiver demonstrates understanding of disease process, treatment plan, medications, and discharge instructions  Description: Complete learning assessment and assess knowledge base    Interventions:  - Provide teaching at level of understanding  - Provide teaching via preferred learning methods  Outcome: Progressing     Problem: NEUROSENSORY - ADULT  Goal: Achieves stable or improved neurological status  Description: INTERVENTIONS  - Monitor and report changes in neurological status  - Monitor vital signs such as temperature, blood pressure, glucose, and any other labs ordered   - Initiate measures to prevent increased intracranial pressure  - Monitor for seizure activity and implement precautions if appropriate      Outcome: Progressing  Goal: Remains free of injury related to seizures activity  Description: INTERVENTIONS  - Maintain airway, patient safety  and administer oxygen as ordered  - Monitor patient for seizure activity, document and report duration and description of seizure to physician/advanced practitioner  - If seizure occurs,  ensure patient safety during seizure  - Reorient patient post seizure  - Seizure pads on all 4 side rails  - Instruct patient/family to notify RN of any seizure activity including if an aura is experienced  - Instruct patient/family to call for assistance with activity based on nursing assessment  - Administer anti-seizure medications if ordered    Outcome: Progressing  Goal: Achieves maximal functionality and self care  Description: INTERVENTIONS  - Monitor swallowing and airway patency with patient fatigue and changes in neurological status  - Encourage and assist patient to increase activity and self care     - Encourage visually impaired, hearing impaired and aphasic patients to use assistive/communication devices  Outcome: Progressing     Problem: CARDIOVASCULAR - ADULT  Goal: Maintains optimal cardiac output and hemodynamic stability  Description: INTERVENTIONS:  - Monitor I/O, vital signs and rhythm  - Monitor for S/S and trends of decreased cardiac output  - Administer and titrate ordered vasoactive medications to optimize hemodynamic stability  - Assess quality of pulses, skin color and temperature  - Assess for signs of decreased coronary artery perfusion  - Instruct patient to report change in severity of symptoms  Outcome: Progressing  Goal: Absence of cardiac dysrhythmias or at baseline rhythm  Description: INTERVENTIONS:  - Continuous cardiac monitoring, vital signs, obtain 12 lead EKG if ordered  - Administer antiarrhythmic and heart rate control medications as ordered  - Monitor electrolytes and administer replacement therapy as ordered  Outcome: Progressing     Problem: RESPIRATORY - ADULT  Goal: Achieves optimal ventilation and oxygenation  Description: INTERVENTIONS:  - Assess for changes in respiratory status  - Assess for changes in mentation and behavior  - Position to facilitate oxygenation and minimize respiratory effort  - Oxygen administered by appropriate delivery if ordered  - Initiate smoking cessation education as indicated  - Encourage broncho-pulmonary hygiene including cough, deep breathe, Incentive Spirometry  - Assess the need for suctioning and aspirate as needed  - Assess and instruct to report SOB or any respiratory difficulty  - Respiratory Therapy support as indicated  Outcome: Progressing     Problem: GASTROINTESTINAL - ADULT  Goal: Minimal or absence of nausea and/or vomiting  Description: INTERVENTIONS:  - Administer IV fluids if ordered to ensure adequate hydration  - Maintain NPO status until nausea and vomiting are resolved  - Nasogastric tube if ordered  - Administer ordered antiemetic medications as needed  - Provide nonpharmacologic comfort measures as appropriate  - Advance diet as tolerated, if ordered  - Consider nutrition services referral to assist patient with adequate nutrition and appropriate food choices  Outcome: Progressing  Goal: Maintains or returns to baseline bowel function  Description: INTERVENTIONS:  - Assess bowel function  - Encourage oral fluids to ensure adequate hydration  - Administer IV fluids if ordered to ensure adequate hydration  - Administer ordered medications as needed  - Encourage mobilization and activity  - Consider nutritional services referral to assist patient with adequate nutrition and appropriate food choices  Outcome: Progressing  Goal: Maintains adequate nutritional intake  Description: INTERVENTIONS:  - Monitor percentage of each meal consumed  - Identify factors contributing to decreased intake, treat as appropriate  - Assist with meals as needed  - Monitor I&O, weight, and lab values if indicated  - Obtain nutrition services referral as needed  Outcome: Progressing  Goal: Establish and maintain optimal ostomy function  Description: INTERVENTIONS:  - Assess bowel function  - Encourage oral fluids to ensure adequate hydration  - Administer IV fluids if ordered to ensure adequate hydration   - Administer ordered medications as needed  - Encourage mobilization and activity  - Nutrition services referral to assist patient with appropriate food choices  - Assess stoma site  - Consider wound care consult   Outcome: Progressing     Problem: GENITOURINARY - ADULT  Goal: Maintains or returns to baseline urinary function  Description: INTERVENTIONS:  - Assess urinary function  - Encourage oral fluids to ensure adequate hydration if ordered  - Administer IV fluids as ordered to ensure adequate hydration  - Administer ordered medications as needed  - Offer frequent toileting  - Follow urinary retention protocol if ordered  Outcome: Progressing  Goal: Absence of urinary retention  Description: INTERVENTIONS:  - Assess patients ability to void and empty bladder  - Monitor I/O  - Bladder scan as needed  - Discuss with physician/AP medications to alleviate retention as needed  - Discuss catheterization for long term situations as appropriate  Outcome: Progressing  Goal: Urinary catheter remains patent  Description: INTERVENTIONS:  - Assess patency of urinary catheter  - If patient has a chronic velázquez, consider changing catheter if non-functioning  - Follow guidelines for intermittent irrigation of non-functioning urinary catheter  Outcome: Progressing     Problem: METABOLIC, FLUID AND ELECTROLYTES - ADULT  Goal: Electrolytes maintained within normal limits  Description: INTERVENTIONS:  - Monitor labs and assess patient for signs and symptoms of electrolyte imbalances  - Administer electrolyte replacement as ordered  - Monitor response to electrolyte replacements, including repeat lab results as appropriate  - Instruct patient on fluid and nutrition as appropriate  Outcome: Progressing  Goal: Fluid balance maintained  Description: INTERVENTIONS:  - Monitor labs   - Monitor I/O and WT  - Instruct patient on fluid and nutrition as appropriate  - Assess for signs & symptoms of volume excess or deficit  Outcome: Progressing  Goal: Glucose maintained within target range  Description: INTERVENTIONS:  - Monitor Blood Glucose as ordered  - Assess for signs and symptoms of hyperglycemia and hypoglycemia  - Administer ordered medications to maintain glucose within target range  - Assess nutritional intake and initiate nutrition service referral as needed  Outcome: Progressing     Problem: SKIN/TISSUE INTEGRITY - ADULT  Goal: Skin integrity remains intact  Description: INTERVENTIONS  - Identify patients at risk for skin breakdown  - Assess and monitor skin integrity  - Assess and monitor nutrition and hydration status  - Monitor labs (i e  albumin)  - Assess for incontinence   - Turn and reposition patient  - Assist with mobility/ambulation  - Relieve pressure over bony prominences  - Avoid friction and shearing  - Provide appropriate hygiene as needed including keeping skin clean and dry  - Evaluate need for skin moisturizer/barrier cream  - Collaborate with interdisciplinary team (i e  Nutrition, Rehabilitation, etc )   - Patient/family teaching  Outcome: Progressing  Goal: Incision(s), wounds(s) or drain site(s) healing without S/S of infection  Description: INTERVENTIONS  - Assess and document risk factors for skin impairment   - Assess and document dressing, incision, wound bed, drain sites and surrounding tissue  - Consider nutrition services referral as needed  - Oral mucous membranes remain intact  - Provide patient/ family education  Outcome: Progressing  Goal: Oral mucous membranes remain intact  Description: INTERVENTIONS  - Assess oral mucosa and hygiene practices  - Implement preventative oral hygiene regimen  - Implement oral medicated treatments as ordered  - Initiate Nutrition services referral as needed  Outcome: Progressing     Problem: HEMATOLOGIC - ADULT  Goal: Maintains hematologic stability  Description: INTERVENTIONS  - Assess for signs and symptoms of bleeding or hemorrhage  - Monitor labs  - Administer supportive blood products/factors as ordered and appropriate  Outcome: Progressing     Problem: MUSCULOSKELETAL - ADULT  Goal: Maintain or return mobility to safest level of function  Description: INTERVENTIONS:  - Assess patient's ability to carry out ADLs; assess patient's baseline for ADL function and identify physical deficits which impact ability to perform ADLs (bathing, care of mouth/teeth, toileting, grooming, dressing, etc )  - Assess/evaluate cause of self-care deficits   - Assess range of motion  - Assess patient's mobility  - Assess patient's need for assistive devices and provide as appropriate  - Encourage maximum independence but intervene and supervise when necessary  - Involve family in performance of ADLs  - Assess for home care needs following discharge   - Consider OT consult to assist with ADL evaluation and planning for discharge  - Provide patient education as appropriate  Outcome: Progressing  Goal: Maintain proper alignment of affected body part  Description: INTERVENTIONS:  - Support, maintain and protect limb and body alignment  - Provide patient/ family with appropriate education  Outcome: Progressing

## 2021-02-02 NOTE — ASSESSMENT & PLAN NOTE
One likely malignancy given patient's age and nonspecific finding on CT      Outpatient plan:    · Recommend patient use [employer] to search for local PCP to establish care  · Repeat CT scan in 2-3 months

## 2021-02-03 VITALS
HEIGHT: 72 IN | SYSTOLIC BLOOD PRESSURE: 133 MMHG | HEART RATE: 86 BPM | DIASTOLIC BLOOD PRESSURE: 98 MMHG | OXYGEN SATURATION: 96 % | BODY MASS INDEX: 34.1 KG/M2 | WEIGHT: 251.77 LBS | TEMPERATURE: 97.8 F | RESPIRATION RATE: 18 BRPM

## 2021-02-03 LAB
ANION GAP SERPL CALCULATED.3IONS-SCNC: 4 MMOL/L (ref 4–13)
BACTERIA BLD CULT: NORMAL
BACTERIA BLD CULT: NORMAL
BACTERIA SPEC BFLD CULT: NO GROWTH
BASOPHILS # BLD AUTO: 0.05 THOUSANDS/ΜL (ref 0–0.1)
BASOPHILS NFR BLD AUTO: 1 % (ref 0–1)
BUN SERPL-MCNC: 12 MG/DL (ref 5–25)
CALCIUM SERPL-MCNC: 9.1 MG/DL (ref 8.3–10.1)
CHLORIDE SERPL-SCNC: 107 MMOL/L (ref 100–108)
CO2 SERPL-SCNC: 29 MMOL/L (ref 21–32)
CREAT SERPL-MCNC: 0.8 MG/DL (ref 0.6–1.3)
EOSINOPHIL # BLD AUTO: 0.46 THOUSAND/ΜL (ref 0–0.61)
EOSINOPHIL NFR BLD AUTO: 6 % (ref 0–6)
ERYTHROCYTE [DISTWIDTH] IN BLOOD BY AUTOMATED COUNT: 13.4 % (ref 11.6–15.1)
GFR SERPL CREATININE-BSD FRML MDRD: 108 ML/MIN/1.73SQ M
GLUCOSE SERPL-MCNC: 91 MG/DL (ref 65–140)
GRAM STN SPEC: NORMAL
GRAM STN SPEC: NORMAL
HCT VFR BLD AUTO: 38.6 % (ref 36.5–49.3)
HGB BLD-MCNC: 12.5 G/DL (ref 12–17)
IMM GRANULOCYTES # BLD AUTO: 0.04 THOUSAND/UL (ref 0–0.2)
IMM GRANULOCYTES NFR BLD AUTO: 1 % (ref 0–2)
LYMPHOCYTES # BLD AUTO: 1.64 THOUSANDS/ΜL (ref 0.6–4.47)
LYMPHOCYTES NFR BLD AUTO: 21 % (ref 14–44)
MCH RBC QN AUTO: 30.3 PG (ref 26.8–34.3)
MCHC RBC AUTO-ENTMCNC: 32.4 G/DL (ref 31.4–37.4)
MCV RBC AUTO: 94 FL (ref 82–98)
MONOCYTES # BLD AUTO: 0.62 THOUSAND/ΜL (ref 0.17–1.22)
MONOCYTES NFR BLD AUTO: 8 % (ref 4–12)
NEUTROPHILS # BLD AUTO: 5.17 THOUSANDS/ΜL (ref 1.85–7.62)
NEUTS SEG NFR BLD AUTO: 63 % (ref 43–75)
NRBC BLD AUTO-RTO: 0 /100 WBCS
PLATELET # BLD AUTO: 295 THOUSANDS/UL (ref 149–390)
PMV BLD AUTO: 9.6 FL (ref 8.9–12.7)
POTASSIUM SERPL-SCNC: 4.2 MMOL/L (ref 3.5–5.3)
RBC # BLD AUTO: 4.12 MILLION/UL (ref 3.88–5.62)
SODIUM SERPL-SCNC: 140 MMOL/L (ref 136–145)
WBC # BLD AUTO: 7.98 THOUSAND/UL (ref 4.31–10.16)

## 2021-02-03 PROCEDURE — 99238 HOSP IP/OBS DSCHRG MGMT 30/<: CPT | Performed by: INTERNAL MEDICINE

## 2021-02-03 PROCEDURE — 94761 N-INVAS EAR/PLS OXIMETRY MLT: CPT

## 2021-02-03 PROCEDURE — 85025 COMPLETE CBC W/AUTO DIFF WBC: CPT | Performed by: STUDENT IN AN ORGANIZED HEALTH CARE EDUCATION/TRAINING PROGRAM

## 2021-02-03 PROCEDURE — 80048 BASIC METABOLIC PNL TOTAL CA: CPT | Performed by: STUDENT IN AN ORGANIZED HEALTH CARE EDUCATION/TRAINING PROGRAM

## 2021-02-03 RX ORDER — ALBUTEROL SULFATE 90 UG/1
2 AEROSOL, METERED RESPIRATORY (INHALATION) EVERY 4 HOURS PRN
Qty: 2 INHALER | Refills: 3 | Status: SHIPPED | OUTPATIENT
Start: 2021-02-03

## 2021-02-03 RX ORDER — NICOTINE 21 MG/24HR
1 PATCH, TRANSDERMAL 24 HOURS TRANSDERMAL DAILY
Qty: 28 PATCH | Refills: 0 | Status: SHIPPED | OUTPATIENT
Start: 2021-02-03

## 2021-02-03 RX ORDER — DOCUSATE SODIUM 100 MG/1
100 CAPSULE, LIQUID FILLED ORAL 2 TIMES DAILY PRN
Qty: 10 CAPSULE | Refills: 0 | Status: SHIPPED | OUTPATIENT
Start: 2021-02-03

## 2021-02-03 RX ORDER — AMOXICILLIN AND CLAVULANATE POTASSIUM 875; 125 MG/1; MG/1
1 TABLET, FILM COATED ORAL EVERY 12 HOURS SCHEDULED
Qty: 20 TABLET | Refills: 0 | Status: SHIPPED | OUTPATIENT
Start: 2021-02-03 | End: 2021-02-13

## 2021-02-03 RX ORDER — LEVOTHYROXINE SODIUM 0.03 MG/1
25 TABLET ORAL
Qty: 90 TABLET | Refills: 1 | Status: SHIPPED | OUTPATIENT
Start: 2021-02-04

## 2021-02-03 RX ADMIN — NICOTINE 21 MG: 21 PATCH, EXTENDED RELEASE TRANSDERMAL at 08:46

## 2021-02-03 RX ADMIN — CEFTRIAXONE SODIUM 2000 MG: 10 INJECTION, POWDER, FOR SOLUTION INTRAVENOUS at 11:46

## 2021-02-03 RX ADMIN — METRONIDAZOLE 500 MG: 500 TABLET ORAL at 05:41

## 2021-02-03 RX ADMIN — ACETAMINOPHEN 975 MG: 325 TABLET ORAL at 05:41

## 2021-02-03 RX ADMIN — LEVOTHYROXINE SODIUM 25 MCG: 25 TABLET ORAL at 05:41

## 2021-02-03 RX ADMIN — ENOXAPARIN SODIUM 40 MG: 40 INJECTION SUBCUTANEOUS at 08:47

## 2021-02-03 NOTE — DISCHARGE INSTRUCTIONS
Chest Tubes   WHAT YOU NEED TO KNOW:   What do I need to know about a chest tube? A chest tube is also known as chest drain or chest drainage tube  It is a plastic tube that is put through the side of your chest  It uses a suction device to remove air, blood, or fluid from around your heart or lung  A chest tube will help you breathe more easily  How do I prepare for a chest tube to be inserted? Your healthcare provider will tell you how to prepare  You may be given general anesthesia to keep you asleep and free from pain during the procedure  You may instead be given local anesthesia or a nerve block  You will be able to feel some pressure during the procedure, but you should not feel any pain  You may be given antibiotics to prevent a bacterial infection  Tell your healthcare provider if you have ever had an allergic reaction to anesthesia or an antibiotic  What will happen when a chest tube is inserted? Your healthcare provider will make a small incision in your chest  A tool is used to make an opening through the chest muscle  The chest tube is inserted slowly until it reaches the pleural space or chest cavity  Your healthcare provider may use an ultrasound to guide him or her  When the tube is in place, it will be connected to suction and a drainage system  Stitches may be sewn into your chest wall to hold the tube in place  Tape may also be used to secure the tube before it is covered with a bandage  What happens after a chest tube has been inserted? · Medicines  may be given to relieve pain or prevent a bacterial infection  · An x-ray or a CT scan  may be used to make sure the tube is in the right place  You may also need an x-ray after your chest tube is removed  What do I need to know about chest tube removal?   · Your healthcare provider will tell you when the chest tube can be removed  After heart surgery, the chest may be removed within 72 hours   For lungs, the chest tube can be taken out when your lung is working normally again  One sign of this is little or no fluid draining into the chest tube  Another sign is no air leaking for 1 to 2 days  You may need a chest x-ray to make sure your lung is working as it should  · You may be given medicine to treat pain before the tube is removed  The tape will be removed  The stitches holding the tube in place will be loosened  You may need to breathe a certain way as the tube is taken out  Your healthcare provider will remove the tube  He or she may tighten the stitches to close the opening  He or she will cover the area with a bandage that will stop air from getting into your chest     What are the risks of a chest tube? · You may get an infection in the area where the tube was inserted  The tube may damage organs that are close to your lungs  Your chest tube may move out of place when you move or turn  If this happens, you may need to have another chest tube put in     · You may get a blood clot in your leg or arm  This can cause pain and swelling, and it can stop blood from flowing where it needs to go in your body  The blood clot can break loose and travel to your lungs  A blood clot in your lungs can cause chest pain and trouble breathing  This can be life-threatening  CARE AGREEMENT:   You have the right to help plan your care  Learn about your health condition and how it may be treated  Discuss treatment options with your healthcare providers to decide what care you want to receive  You always have the right to refuse treatment  The above information is an  only  It is not intended as medical advice for individual conditions or treatments  Talk to your doctor, nurse or pharmacist before following any medical regimen to see if it is safe and effective for you  © Copyright [address] Information is for End User's use only and may not be sold, redistributed or otherwise used for commercial purposes   All illustrations and images included in CareNotes® are the copyrighted property of [employer]  or Emanuel Huitron         Chronic Lung Disease and Infection Prevention   WHAT YOU NEED TO KNOW:   Why is preventing infections important when you have a chronic lung condition? A chronic lung condition can make infections such as a cold or the flu serious  These infections can cause more damage to your lungs  One episode of pneumonia puts you at risk for more respiratory infections in the future  What can I do to prevent a respiratory infection? · Wash your hands often  This is the most important thing you can do to prevent infections  Wash your hands several times each day  Wash after you use the bathroom, change a child's diaper, and before you prepare or eat food  Use soap and water every time you wash your hands  Rub your soapy hands together, lacing your fingers  Use the fingers of one hand to scrub under the nails of the other hand  Wash for at least 20 seconds  Rinse with warm, running water for several seconds  Then dry your hands with a clean towel or paper towel  Use hand  that contains alcohol if soap and water are not available  Do not touch your eyes, nose, or mouth without washing your hands first          · Cover a sneeze or cough  Use a tissue that covers your mouth and nose  Throw the tissue away in a trash can right away  Use the bend of your arm if a tissue is not available  Then wash your hands well with soap and water or use a hand   Do not stand close to anyone who is sneezing or coughing  · Avoid crowds during flu season  Flu season is from late October to the middle of March  Do not have close contact with someone who is sick  Ask friends and family to visit only when they are not sick  · Ask about vaccines you may need  Vaccines help protect you and others around you from some infections  ? Get the influenza (flu) vaccine as soon as recommended each year    The flu vaccine is available starting in September or October  Flu viruses change, so it is important to get a flu vaccine every year  ? Get the pneumonia vaccine if recommended  This vaccine is usually recommended every 5 years  Your provider will tell you when to get this vaccine, if needed  ? Talk to your healthcare provider about your vaccine history  Tell him or her if you did not get certain vaccines as a child, or you did not get all recommended doses  Tell him or her if you do not know your vaccine history  He or she will tell you which vaccines you need, and when to get them  What else can I do to stay healthy? · Do not smoke  Nicotine and other chemicals in cigarettes and cigars can cause lung damage  Ask your healthcare provider for information if you currently smoke and need help to quit  E-cigarettes or smokeless tobacco still contain nicotine  Talk to your healthcare provider before you use these products  · Build resistance to infection  Eat a variety of healthy foods such as fruits, vegetables, and whole-grain foods  Choose dairy foods, meat, and other protein foods that are low in fat  Get plenty of sleep and physical activity, such as exercise  Your healthcare provider can help you create an exercise plan that is right for you  · Protect your mouth from germs that lead to infection  Brush your teeth at least 2 times per day  See your dentist at least every 6 months  CARE AGREEMENT:   You have the right to help plan your care  Learn about your health condition and how it may be treated  Discuss treatment options with your healthcare providers to decide what care you want to receive  You always have the right to refuse treatment  The above information is an  only  It is not intended as medical advice for individual conditions or treatments  Talk to your doctor, nurse or pharmacist before following any medical regimen to see if it is safe and effective for you    © Copyright [address] Information is for [employer] use only and may not be sold, redistributed or otherwise used for commercial purposes  All illustrations and images included in CareNotes® are the copyrighted property of [employer] , [employer]  or [facility]    Pleural Effusion   WHAT YOU NEED TO KNOW:   Pleural effusion is fluid buildup in the space between the layers of the pleura  The pleura is a thin piece of tissue with 2 layers  One layer rests directly on the lungs  The other rests on the chest wall  There is normally a small amount of fluid between these layers  This fluid helps your lungs move easily when you breathe  DISCHARGE INSTRUCTIONS:   Call your doctor if:   · You feel faint, or you cannot think clearly  · Your lips or fingernails turn blue  · You find it very hard to breathe  · You have a fever  · Your breathing problems do not go away or get worse  · Your pain does not go away or gets worse  · You cough up yellow, green, gray, or bloody mucus  · You have questions or concerns about your condition or care  Medicines: You may need any of the following:  · Diuretics  may help decrease extra fluid caused by heart failure or other problems  · Antibiotics  help prevent or treat an infection caused by bacteria  · NSAIDs  help decrease swelling and pain or fever  This medicine is available with or without a doctor's order  NSAIDs can cause stomach bleeding or kidney problems in certain people  If you take blood thinner medicine, always ask your healthcare provider if NSAIDs are safe for you  Always read the medicine label and follow directions  · Prescription pain medicine  may be given  Ask your healthcare provider how to take this medicine safely  Some prescription pain medicines contain acetaminophen  Do not take other medicines that contain acetaminophen without talking to your healthcare provider  Too much acetaminophen may cause liver damage  Prescription pain medicine may cause constipation  Ask your healthcare provider how to prevent or treat constipation  · Steroids  or other types of medicines may be given to decrease swelling  · Take your medicine as directed  Contact your healthcare provider if you think your medicine is not helping or if you have side effects  Tell him or her if you are allergic to any medicine  Keep a list of the medicines, vitamins, and herbs you take  Include the amounts, and when and why you take them  Bring the list or the pill bottles to follow-up visits  Carry your medicine list with you in case of an emergency  Follow up with your healthcare provider as directed:  Write down your questions so you remember to ask them during your visits  Self-care:   · Use pressure to decrease pain  Hold a pillow against your chest when you cough or take a deep breath  · Do not smoke , and do not allow others to smoke around you  If you smoke, it is never too late to quit  Smoking increases your risk for lung infections such as pneumonia  Smoking also makes it harder for you to get better after having a lung problem  Ask your healthcare provider for information if you need help quitting  · Drink liquids as directed and rest as needed  Liquids help to keep your air passages moist and better able to get rid of germs and other irritants  Ask your healthcare provider how much liquid to drink each day and which liquids are best for you  You may feel like resting more  Slowly start to do more each day  Rest when you feel it is needed  · Deep breathing and coughing  will decrease your risk for a lung infection  Take a deep breath and hold it for as long as you can  Let the air out and then cough strongly  Deep breaths help open your airway  You may be given an incentive spirometer to help you take deep breaths  Put the plastic piece in your mouth and take a slow, deep breath  Then let the air out and cough   Repeat these steps 10 times every hour  © Copyright [address] Information is for End User's use only and may not be sold, redistributed or otherwise used for commercial purposes  All illustrations and images included in CareNotes® are the copyrighted property of [employer]  or Emanuel Montiel  The above information is an  only  It is not intended as medical advice for individual conditions or treatments  Talk to your doctor, nurse or pharmacist before following any medical regimen to see if it is safe and effective for you

## 2021-02-03 NOTE — PLAN OF CARE
Problem: Potential for Falls  Goal: Patient will remain free of falls  Description: INTERVENTIONS:  - Assess patient frequently for physical needs  -  Identify cognitive and physical deficits and behaviors that affect risk of falls    -  [place] fall precautions as indicated by assessment   - Educate patient/family on patient safety including physical limitations  - Instruct patient to call for assistance with activity based on assessment  - Modify environment to reduce risk of injury  - Consider OT/PT consult to assist with strengthening/mobility  Outcome: Progressing     Problem: PAIN - ADULT  Goal: Verbalizes/displays adequate comfort level or baseline comfort level  Description: Interventions:  - Encourage patient to monitor pain and request assistance  - Assess pain using appropriate pain scale  - Administer analgesics based on type and severity of pain and evaluate response  - Implement non-pharmacological measures as appropriate and evaluate response  - Consider cultural and social influences on pain and pain management  - Notify physician/advanced practitioner if interventions unsuccessful or patient reports new pain  Outcome: Progressing     Problem: INFECTION - ADULT  Goal: Absence or prevention of progression during hospitalization  Description: INTERVENTIONS:  - Assess and monitor for signs and symptoms of infection  - Monitor lab/diagnostic results  - Monitor all insertion sites, i e  indwelling lines, tubes, and drains  - Monitor endotracheal if appropriate and nasal secretions for changes in amount and color  - [place] appropriate cooling/warming therapies per order  - Administer medications as ordered  - Instruct and encourage patient and family to use good hand hygiene technique  - Identify and instruct in appropriate isolation precautions for identified infection/condition  Outcome: Progressing     Problem: SAFETY ADULT  Goal: Patient will remain free of falls  Description: INTERVENTIONS:  - Assess patient frequently for physical needs  -  Identify cognitive and physical deficits and behaviors that affect risk of falls    -  [place] fall precautions as indicated by assessment   - Educate patient/family on patient safety including physical limitations  - Instruct patient to call for assistance with activity based on assessment  - Modify environment to reduce risk of injury  - Consider OT/PT consult to assist with strengthening/mobility  Outcome: Progressing  Goal: Maintain or return to baseline ADL function  Description: INTERVENTIONS:  -  Assess patient's ability to carry out ADLs; assess patient's baseline for ADL function and identify physical deficits which impact ability to perform ADLs (bathing, care of mouth/teeth, toileting, grooming, dressing, etc )  - Assess/evaluate cause of self-care deficits   - Assess range of motion  - Assess patient's mobility; develop plan if impaired  - Assess patient's need for assistive devices and provide as appropriate  - Encourage maximum independence but intervene and supervise when necessary  - Involve family in performance of ADLs  - Assess for home care needs following discharge   - Consider OT consult to assist with ADL evaluation and planning for discharge  - Provide patient education as appropriate  Outcome: Progressing  Goal: Maintain or return mobility status to optimal level  Description: INTERVENTIONS:  - Assess patient's baseline mobility status (ambulation, transfers, stairs, etc )    - Identify cognitive and physical deficits and behaviors that affect mobility  - Identify mobility aids required to assist with transfers and/or ambulation (gait belt, sit-to-stand, lift, walker, cane, etc )  - [place] fall precautions as indicated by assessment  - Record patient progress and toleration of activity level on Mobility SBAR; progress patient to next Phase/Stage  - Instruct patient to call for assistance with activity based on assessment  - Consider rehabilitation consult to assist with strengthening/weightbearing, etc   Outcome: Progressing     Problem: RESPIRATORY - ADULT  Goal: Achieves optimal ventilation and oxygenation  Description: INTERVENTIONS:  - Assess for changes in respiratory status  - Assess for changes in mentation and behavior  - Position to facilitate oxygenation and minimize respiratory effort  - Oxygen administered by appropriate delivery if ordered  - Initiate smoking cessation education as indicated  - Encourage broncho-pulmonary hygiene including cough, deep breathe, Incentive Spirometry  - Assess the need for suctioning and aspirate as needed  - Assess and instruct to report SOB or any respiratory difficulty  - Respiratory Therapy support as indicated  Outcome: Progressing

## 2021-02-03 NOTE — DISCHARGE SUMMARY
INTERNAL MEDICINE RESIDENCY DISCHARGE SUMMARY     Zoey [place]   39 y o  male  MRN: [id number]  Room/Bed: /[place] [address]    Encounter: [id number]    Principal Problem:    Pleural effusion, left  Active Problems:    Acquired hypothyroidism    History of DVT (deep vein thrombosis)    Tobacco abuse    Mild intermittent asthma    Abnormal finding on CT scan      * Pleural effusion, left  Assessment & Plan  Left chest discomfort and shortness of breath started 1/23, per patient was started on Z-Mario 1/25 worsening shortness of breath and chest pain, went back to the ED, was treated with meds - not in the EMR - with temporary relief 1/29 patient went to the ED at [facility] x-ray chest and CT chest showing moderate-to-large loculated left pleural effusion and compressive atelectasis left lung  S/p chest tube  Patient's pleural effusion met criteria for exudative and likely a complicated infusion given loculation  Workup with atypical infection is negative  No positive blood cultures  tPA done on 1//31 by IR-->drained 2450 cc serosanguineous fluid in first 24 hours  Lost IV access 2/1--> PICC placed same day  Post tPA imaging:   CXR 2/1 showed ground-glass opacity in the left lung but no definite effusion  CT 2/1 showed decreased left pleural effusion remaining, small left hydropneumothorax, improved left lung aeration, and soft tissue thickening/fat infiltration at anterior mediastinum bordering left pleural margin  On day of discharge 02/03, patient desaturated to mid 80s with ambulation  RT evaluated the patient for home O2, and he did not desaturate (97% at rest and 91% with ambulation)    · Ceftriaxone--2000mg q25h (Day 5)   · Starting Metronidazole (Day 3) for anaerobic coverage  · Follow-up cultures  · IR removed drain 2/2-->will observe for 24 hours  · Wean oxygen as tolerated  · Smoking cessation strongly encouraged  · Not eligible for home O2    Outpatient plan:  · Augmentin PO 10 day course  · Recommend patient use [employer] to search for local PCP to establish care    Abnormal finding on CT scan  Assessment & Plan  One likely malignancy given patient's age and nonspecific finding on CT  Outpatient plan:    · Recommend patient use [employer] to search for local PCP to establish care  · Repeat CT scan in 2-3 months    Mild intermittent asthma  Assessment & Plan  Hx of mild intermittent asthma  not on any bronchodilator    Plan   Albuterol inhaler prn - no evidence of wheezing today  Tobacco abuse  Assessment & Plan  Smoking 1 pack a day since age 25 Quit since January 25 in context of shortness of breath and chest pain  Patient is motivated to quit  Plan  · Nicotine patch 21 mg daily    Outpatient plan:  · Continue nicotine patch and add on a gum for shorter acting control  · Recommend patient use [employer] to search for local PCP to establish care    History of DVT (deep vein thrombosis)  Assessment & Plan  DVT left leg in 2010 secondary to cellulitis due to injury  Patient was on Coumadin for 1 year afterward    Plan  · Lovenox 40 mg subcutaneous daily    Acquired hypothyroidism  Assessment & Plan  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 16 s/p radioactive iodine  Patient not talking Levothyroxine for "Many" years and denies any related symptoms     Plan  · Levothyroxine 25 mg  Sepsis (HCC)-resolved as of 2/3/2021  Assessment & Plan  Present on admission secondary to pneumonia  No longer meets sepsis criteria  · Follow-up blood cultures  · Ceftriaxone and metronidazole for likely CAP      [address]     Patient is a 19-year-old with PMH significant for hypothyroidism s/p order disease at age 16 with radioactive iodine ablation, provoked DVT in 2010, and mild intermittent asthma who was transferred to [facility] from [facility] on 1/30    Around a week prior on 01/23, the patient expressed onset of subjective fever/chills, increasing shortness of breath, left-sided chest discomfort, and minimally productive cough  At that time, he had suspected a COVID infection given his girlfriend tested positive days earlier  [facility] for ED evaluation  Although COVID PCR was negative, the patient was given a course of azithromycin for presumptive community-acquired pneumonia  At the 2nd visit, CT chest was performed and revealed a small left pleural effusion--he was sent home on symptomatic treatment  Symptoms worsened for the patient, and he presented to [address] [place] [address] on 01/29  CT scan was repeated, which revealed a moderate to large loculated pleural effusion with concern for abscess or empyema  Based on is rule presentation, the patient was septic and started on broad-spectrum antibiotics vancomycin and Zosyn  Given his complicated parapneumonic pleural effusion, he was transferred to [facility] under the care of SOD for IR evaluation and treatment for chest tube plus/minus thrombolytics  Immediately upon transfer on 01/30, IR placed left chest tube and administered tPA/Dornase on 1/31 (drained approximately 2 5L)  Effusion studies showed evidence of exudative pleural effusion and he was subsequently switched to ceftriaxone and doxycycline  Throughout his admission, patient was given supplemental O2 and pain control while the primary team followed on infectious studies:  No growth on blood cultures 2x, negative HIV, negative strep pneumo, negative Legionella  On 02/01, a PICC line was placed for better IV access and follow-up imaging was done to monitor the status of the pleural effusion: showed a significant reduction  To better cover for anaerobic organisms, his regimen was modified to ceftriaxone and metronidazole that same day  On 02/02, the patient's condition greatly improved and was weaned off of oxygen, chest tube was removed; medically cleared for discharge    He was evaluated for home O2 on 02/03, but was ineligible and discharged home that same day  Physical Exam  Vitals signs reviewed  Constitutional:       Appearance: Normal appearance  He is not ill-appearing, toxic-appearing or diaphoretic  HENT:      Head: Normocephalic and atraumatic  Mouth/Throat:      Mouth: Mucous membranes are moist       Pharynx: Oropharynx is clear  Eyes:      General: No scleral icterus  Conjunctiva/sclera: Conjunctivae normal    Cardiovascular:      Rate and Rhythm: Normal rate and regular rhythm  Heart sounds: No murmur  No gallop  Pulmonary:      Effort: Pulmonary effort is normal       Breath sounds: Examination of the left-middle field reveals decreased breath sounds  Examination of the left-lower field reveals decreased breath sounds  Decreased breath sounds (slightly, but better aeration compared to yesterday) present  No wheezing, rhonchi or rales  Abdominal:      General: Bowel sounds are normal  There is no distension  Palpations: Abdomen is soft  Tenderness: There is no abdominal tenderness  Musculoskeletal:         General: No swelling or tenderness  Right lower leg: No edema  Left lower leg: No edema  Skin:     General: Skin is warm  Capillary Refill: Capillary refill takes less than 2 seconds  Coloration: Skin is not pale  Findings: No erythema or rash  Neurological:      General: No focal deficit present  Mental Status: He is alert and oriented to person, place, and time  Psychiatric:         Mood and Affect: Mood normal          Behavior: Behavior normal          Thought Content:  Thought content normal          Judgment: Judgment normal        Vitals:    02/03/21 1033 02/03/21 1036 02/03/21 1037 02/03/21 1051   BP:       BP Location:       Pulse: 86 87 98 86   Resp:       Temp:       TempSrc:       SpO2: (!) 81% (!) 83% (!) 84% 96%   Weight:       Height:           DISCHARGE INFORMATION     PCP at Discharge: None-->will establish care soon    Admitting Provider: Ronit Ashley MD  Admission Date: 1/30/2021    Discharge Provider: No att  providers found  Discharge Date: 2/3/2021    Discharge Disposition: Home/Self Care  Discharge Condition: good  Discharge with Lines: no    Discharge Diet: regular diet  Activity Restrictions: none--> increase activity as tolerated  Test Results Pending at Discharge: None    Discharge Diagnoses:  Principal Problem:    Pleural effusion, left  Active Problems:    Acquired hypothyroidism    History of DVT (deep vein thrombosis)    Tobacco abuse    Mild intermittent asthma    Abnormal finding on CT scan  Resolved Problems:    Sepsis [facility])      Consulting Providers:      Diagnostic & Therapeutic Procedures Performed:  Xr Chest Portable    Result Date: 2/1/2021  Impression: Pigtail catheter over medial left base with no definite effusion or pneumothorax  Persistent groundglass opacity throughout the left lung  Workstation performed: [id number]     Xr Chest Portable    Result Date: 1/31/2021  Impression: Decreased left pleural effusion with subjacent compressive atelectasis  Workstation performed: [id number]     Ct Chest Wo Contrast    Result Date: 2/1/2021  Impression: 1  Status post left chest tube placement  Decreased left pleural effusion with small pleural effusion remaining  Small left hydropneumothorax  2  Improved aeration of the left lung field  3   Soft tissue thickening/fat infiltration at the anterior mediastinum bordering the left pleural margin  This is nonspecific and while this could be inflammatory, underlying developing neoplastic process is not excluded  Continued imaging follow-up is advised  A short-term follow-up contrast chest CT could be performed in 2-3 months versus FDG PET CT  The study was marked in EPIC for significant notification  Workstation performed: [id number]     Ir Chest Tube Placement    Result Date: 2/1/2021  Impression: Impression: 1    Successful placement of a 12 French catheter into the left pleural space under ultrasound guidance, and 30 cc of cloudy yellow fluid was aspirated and a specimen sent to the lab as described above  2  Plan for TPA/dornase tomorrow  Workstation performed: [id number]       Code Status: Level 1 - Full Code  Advance Directive & Living Will: <no information>  Power of :    POLST:      Medications:  Current Discharge Medication List      STOP taking these medications       aspirin (ECOTRIN LOW STRENGTH) 81 mg EC tablet Comments:   Reason for Stopping:             Current Discharge Medication List      START taking these medications    Details   albuterol (PROVENTIL HFA,VENTOLIN HFA) 90 mcg/act inhaler Inhale 2 puffs every 4 (four) hours as needed for wheezing  Qty: 2 Inhaler, Refills: 3    Comments: Substitution to a formulary equivalent within the same pharmaceutical class is authorized  Associated Diagnoses: Mild intermittent asthma, unspecified whether complicated      amoxicillin-clavulanate (AUGMENTIN) 875-125 mg per tablet Take 1 tablet by mouth every 12 (twelve) hours for 10 days  Qty: 20 tablet, Refills: 0    Associated Diagnoses: Pleural effusion, left; Sepsis ([address] )      docusate sodium (COLACE) 100 mg capsule Take 1 capsule (100 mg total) by mouth 2 (two) times a day as needed for constipation  Qty: 10 capsule, Refills: 0    Associated Diagnoses: Constipation      levothyroxine 25 mcg tablet Take 1 tablet (25 mcg total) by mouth daily in the early morning  Qty: 90 tablet, Refills: 1    Associated Diagnoses: Acquired hypothyroidism      nicotine (NICODERM CQ) 21 mg/24 hr TD 24 hr patch Place 1 patch on the skin daily  Qty: 28 patch, Refills: 0    Associated Diagnoses: Tobacco abuse      nicotine polacrilex (NICORETTE) 2 mg gum Chew 1 each (2 mg total) as needed for smoking cessation  Qty: 100 each, Refills: 1    Associated Diagnoses: Tobacco abuse           Current Discharge Medication List          Allergies:   Allergies Allergen Reactions    Ibuprofen Hives, Shortness Of Breath and Swelling       FOLLOW-UP     PCP Outpatient Follow-up:  yes  Patient will establish care with  Roseanna Mullins    Consulting Providers Follow-up:  none required     Active Issues Requiring Follow-up:   Patient will undergo CT chest to re-evaluate incidental finding in 2-3 months    Discharge Statement:   I spent 30 minutes minutes discharging the patient  This time was spent on the day of discharge  I had direct contact with the patient on the day of discharge  Additional documentation is required if more than 30 minutes were spent on discharge  Portions of the record may have been created with voice recognition software  Occasional wrong word or "sound a like" substitutions may have occurred due to the inherent limitations of voice recognition software    Read the chart carefully and recognize, using context, where substitutions have occurred     ==  Mae Tellez, DO  [address]  Internal Medicine Resident PGY-1

## 2021-02-03 NOTE — PROGRESS NOTES
Ambulatory pulse ox check done- Pt oxygen saturation dropped to mid 80s while ambulating  Lowest- 82, highest- 88 on room air  Once back in room resting in bed- oxygen sat increased to 95% on room air  SOD A made aware

## 2021-02-03 NOTE — RESPIRATORY THERAPY NOTE
Home Oxygen Qualifying Test       Patient name: Adina Chappell        : 1975   Date of Test:  February 3, 2021  Diagnosis:      Home Oxygen Test:    **Medicare Guidelines require item(s) 1-5 on all ambulatory patients or 1 and 2 on non-ambulatory patients  1   Baseline SPO2 on Room Air at rest 97 %  2   SPO2 during exercise on Room Air 91%  During exercise monitor SpO2  If SPO2 increases >=89% with ambulation do not add supplemental             oxygen  If <= 88% on room air add O2 via NC and titrate patient  Patient must be ambulated with O2 and titrated to > 88% with exertion  3   SPO2 on Oxygen at rest 97 % 0 lpm     4   SPO2 during exercise on Oxygen 91% a liter flow of 0 lpm     5   Exercise performed:        Walking in hallway          []  Supplemental Home Oxygen is indicated  [x]  Client does not qualify for home oxygen        Respiratory Additional Notes-    [facility], [place]

## 2021-02-04 ENCOUNTER — TELEPHONE (OUTPATIENT)
Dept: INTERNAL MEDICINE CLINIC | Facility: CLINIC | Age: 46
End: 2021-02-04

## 2021-02-04 DIAGNOSIS — Z72.0 TOBACCO ABUSE: Chronic | ICD-10-CM

## 2021-02-04 NOTE — TELEPHONE ENCOUNTER
Dr Beck you saw this patient as in patient in the hospital ,can you please review the script and resend it to the pharmacy with new instructions?       Thank you

## 2021-02-04 NOTE — TELEPHONE ENCOUNTER
[facility] need Rx Directions Clarification on   nicotine polacrilex (NICORETTE) 2 mg gum   (can't use "as needed" need to have max amount per day in directions )

## 2021-07-19 NOTE — UTILIZATION REVIEW
URGENT/EMERGENT  INPATIENT/SPU AUTHORIZATION REQUEST    Date: 07/19/21            # Pages in this Request:     X New Request   Additional Information for PA#:     Office Contact Name:  Ruby Aj Title: Utilization Review, [place] Nurse     Phone: [phone]  Ext  Availability (Date/Time): Wednesday - Friday 8 am- 4 pm    x Inpatient Review  SPU Review        Current       x Late Pick-up   · How your facility was first notified of the Late Pick-up: paths Letter   · When your facility was first notified of the Late Pick-up (date): 7/15/2021         RECIPIENT INFORMATION    Recipient ID#: [id number]   Recipient Name:  Zoey Jones     YOB: 1975  55 y o  Recipient Alias:     Gender:  X Male  Female Medicaid Eligibility ([address] [place] [address]): INSURANCE INFORMATION    (All other private or governmental health insurance benefits must be utilized prior to billing the [place] Program)    Was this admission the result of an MVA, other accident, assault, injury, fall, gunshot, bite etc ? Yes x No                   If yes, provide a brief description of the incident  Does the recipient have other insurance coverage? Yes x No        Insurance Company Name/Policy #      Did that insurance pay on this claim? Yes  No        Did that insurance deny this claim? Yes  No    If yes, reason for denial:      Does the recipient have Medicare? Yes x No        Did Medicare exhaust prior to this admission? Yes  No        Did Medicare partially pay this claim? Yes  No        Did that insurance deny this claim? Yes  No    If yes, reason for denial:          Was the recipient a prisoner at the time of admission?   Yes x No            PROVIDER INFORMATION    Hospital Name: [facility] Provider ID#: [id number]    [address] [place] [address] Physician Name: Lorie Rivas Provider ID#: [id number]        ADMISSION INFORMATION    Type of Admission: (please choose one)    X ED      Direct If yes, from where? Transfer    If yes, transferring hospital (inpatient, rehab, or psych) Provider Name/Provider ID#: Admission Floor or Unit Type: Med Surg     Dates/Times:        ED Date/Time: 1/29/2021  4:00 PM        Observation Date/Time:         Admission Date/Time: 1/29/21  6:15 PM        Discharge or Transfer Date/Time: 1/30/2021  3:03 PM        DIAGNOSIS/PROCEDURE CODES    Primary Diagnosis Code/Primary Diagnosis Code description:  A41 9 Sepsis, unspecified organism   J90 Pleural effusion, not elsewhere classified   I10 Essential (primary) hypertension   E03 9 Hypothyroidism, unspecified  Additional Diagnosis Code(s) and Description(s)-(up to three additional codes):    Procedure Code (one) and description:        CLINICAL INFORMATION - PRIOR ADMISSION ONLY    Is there a prior admission with a discharge date within 30 days of the date of this admission?    x No (Proceed to the next section - "Clinical Information - General Review Checklist:)      Yes (Provide the following information)     Prior admission dates:    MA Prior Authorization Number:        Review Outcome:     Diagnosis Code(s)/Description:    Procedure Code/Description:    Findings:    Treatment:    Condition on Discharge:   Vitals:    Labs:   Imaging:   Medications: Follow-up Instructions:    Disposition:        CLINICAL INFORMATION - GENERAL REVIEW CHECKLIST    EMERGENCY DEPARTMENT: (Proceed to "ADMISSION" if Direct Admission)    Presenting Signs/Symptoms:  40 yo male to ED from home w/ inc SOB , L sided chest discomfort and prod cough x1 week   Intermittent fever and chills  Has tested neg for COVID 1/23 , wife positive   Was tx  presumptively positive based on history, and was prescribed azithromycin, vitamin/mineral supplementation, and symptomatic treatment at [facility] ED 1/23-1/27  Sec ED visit 1/27 CT found sm L pleural effusion   tx w/ muscle relaxants  Admitted IP status w/ L pleural effusion , pneumonia    Plan for ceftriaxone and doxycyline   Hx of graves disease, has gained 30 lbs over the last year , will start levothyroxine , recheck TSH        Medication/treatment prior to arrival in the ED:    Past Medical History:   Past Medical History:   Diagnosis Date    Acquired hypothyroidism     DVT (deep venous thrombosis) (HCC) left leg    Hypertension     Hyperthyroidism     Pleural effusion, left 1/29/2021    SIRS (systemic inflammatory response syndrome) (HCC)     Tobacco abuse        Clinical Exam:    Initial Vital Signs: (Temp, Pulse, Resp, and BP)   ED Triage Vitals [01/29/21 1608]   Temperature Pulse Respirations Blood Pressure SpO2   98 2 °F (36 8 °C) (!) 129 18 (!) 180/94 94 %      Temp Source Heart Rate Source Patient Position - Orthostatic VS BP Location FiO2 (%)   Oral Monitor Lying Right arm --      Pain Score       8           Pertinent Repeat Vital Signs: (include times they were obtained)  01/30/21 0742   --   --   --   --   --   91 %   None (Room air)   --   01/30/21 07:11:04   98 8 °F (37 1 °C)   106Abnormal    16   121/87   98   89 %Abnormal    --   --   01/29/21 22:24:46   --   107Abnormal    18   155/110Abnormal    125   93 %   --   --   01/29/21 1954   --   --   --   --   --   --   None (Room air)   --   01/29/21 1924   --   111Abnormal    28Abnormal    155/110Abnormal    125   94 %   None (Room air)   Lying   01/29/21 1845   --   118Abnormal    30Abnormal    148/104Abnormal    121   98 %   --   --   01/29/21 1830   --   108Abnormal    21   129/89   105   94 %   None (Room air)   --   01/29/21 1815   --   115Abnormal    18   148/95   114   95 %   None (Room air)   Lying   01/29/21 1800   --   124Abnormal    20   151/97   119   96 %   None (Room air)   --   01/29/21 1730   --   118Abnormal    20   128/96   105   91 %   None (Room air)   --   01/29/21 1645   --   121Abnormal    18   135/94   107   93 %   --   --   01/29/21 1630   --   119Abnormal    18   136/91   107   92 %   None (Room air)   Lying 01/29/21 1615   --   127Abnormal    20   164/94   125   93 %   None (Room air            Pertinent Sustained Findings: (include times they were obtained)    Weight in Kilograms:  Wt Readings from Last 1 Encounters:   02/02/21 114 kg (251 lb 12 3 oz)       Pertinent Labs (results):  Results from last 7 days   Lab Units 01/29/21  1722   SARS-COV-2   Negative            Results from last 7 days   Lab Units 01/30/21  0558 01/29/21  1654   WBC Thousand/uL 10 34* 13 03*   HEMOGLOBIN g/dL 12 8 14 2   HEMATOCRIT % 38 9 42 6   PLATELETS Thousands/uL 382 425*   NEUTROS ABS Thousands/µL 7 70* 10 55*            Results from last 7 days   Lab Units 01/30/21  0558 01/29/21  1654   SODIUM mmol/L 133* 135*   POTASSIUM mmol/L 3 7 3 5   CHLORIDE mmol/L 101 97*   CO2 mmol/L 18* 24   ANION GAP mmol/L 14* 14*   BUN mg/dL 12 15   CREATININE mg/dL 0 88 1 16   EGFR ml/min/1 73sq m 104 76   CALCIUM mg/dL 8 3 9 3   MAGNESIUM mg/dL 1 8  --    PHOSPHORUS mg/dL 2 4*  --             Results from last 7 days   Lab Units 01/30/21  0558 01/29/21  1654   AST U/L 13 13   ALT U/L 19 24   ALK PHOS U/L 102 119*   TOTAL PROTEIN g/dL 7 0 8 5*   ALBUMIN g/dL 2 2* 3 0*   TOTAL BILIRUBIN mg/dL 0 62 0 89            Results from last 7 days   Lab Units 01/30/21  0558 01/29/21  1654   GLUCOSE RANDOM mg/dL 79 101           Results from last 7 days   Lab Units 01/29/21  1839   TROPONIN I ng/mL <0 02           Results from last 7 days   Lab Units 01/30/21  0558   PROTIME seconds 13 4   INR   1 04           Results from last 7 days   Lab Units 01/29/21  1654   TSH 3RD GENERATON uIU/mL 8 524*           Results from last 7 days   Lab Units 01/29/21  1654   LACTIC ACID mmol/L 1 1           Results from last 7 days   Lab Units 01/29/21  1654   NT-PRO BNP pg/mL 26           Results from last 7 days   Lab Units 01/29/21  1654   CRP mg/L 370 5*   SED RATE mm/hour 130*           Results from last 7 days   Lab Units 01/29/21  1722   INFLUENZA A PCR   Negative   INFLUENZA B PCR   Negative   RSV PCR   Negative           Results from last 7 days   Lab Units 01/29/21  1654   BLOOD CULTURE   Received in Microbiology Lab  Culture in Progress  Received in Microbiology Lab  Culture in Progress         Radiology (results):  1/29 PCXR Moderate size left pleural effusion   No pneumothorax      1/29 CT chest    1   Moderate to large loculated left pleural effusion  2   Compressive atelectasis throughout the left lung   No evidence of significant underlying airspace consolidation to suggest pneumonia  EKG (results):   1/29 EKG ST   Other tests (results):    Tests pending final results:    Treatment in the ED:   Medication Administration from 01/29/2021 1555 to 01/29/2021 1937       Date/Time Order Dose Route Action Comments     01/29/2021 1734 sodium chloride 0 9 % bolus 1,000 mL 1,000 mL Intravenous New Bag      01/29/2021 1924 morphine (PF) 4 mg/mL injection 4 mg 4 mg Intravenous Given      01/29/2021 1917 iohexol (OMNIPAQUE) 350 MG/ML injection (SINGLE-DOSE) 100 mL 85 mL Intravenous Given            Other treatments:      Change in condition while in the ED:     Response to ED Treatment:          OBSERVATION: (Proceed to "ADMISSION" if Direct Admission)    Orders written during the observation period  Meds Name, dose, route, time, how may doses given:  PRN Meds Name, dose, route, time, how many doses given within the first 24 hrs :  IVs Type, rate, and total amt  ordered/given:  Labs, imaging, other:  Consults and findings:    Test Results during the observation period  Pertinent Lab tests (dates/results):  Culture results (blood, urine, spinal, wound, respiratory, etc ):  Imaging tests (dates/results):  EKG (dates/results):   Other test (dates/results):  Tests pending (dates/results):    Surgical or Invasive Procedures during the observation period  Name of surgery/procedure:  Date & Time:  Patient Response:  Post-operative orders:  Operative Report/Findings:    Response to Treatment, Major Change in Condition, Major Charge in Treatment during the observation period          ADMISSION:    DIRECT Admissions Only:    · Presenting Signs/Symptoms:   ·   · Medication/treatment prior to arrival:  ·   · Past Medical History:  ·   · Clinical Exam on admission:  ·   · Vital Signs on admission: (Temp, Pulse, Resp, and BP)  ·   · Weight in kilograms:     ALL Admissions:    Admission Orders and Other Orders written within the first 24 hrs after admission  Meds Name, dose, route, time, how may doses given:    cefTRIAXone, 1,000 mg, Intravenous, Q24H  doxycycline, 100 mg, Intravenous, Q12H  heparin (porcine), 5,000 Units, Subcutaneous, Q8H [facility]  levothyroxine, 75 mcg, Oral, Early Morning      PRN Meds Name, dose, route, time, how many doses given within the first 24 hrs :  acetaminophen, 650 mg, Oral, Q6H PRN    Or  HYDROmorphone, 1 mg, Intravenous, Q4H PRN  aluminum-magnesium hydroxide-simethicone, 30 mL, Oral, Q6H PRN  docusate sodium, 100 mg, Oral, BID PRN  ondansetron, 4 mg, Intravenous, Q4H PRN           IVs Type, rate, and total amt  Ordered/given:    sodium chloride 0 9 % with KCl 20 mEq/L, 100 mL/hr, Intravenous, Continuous      Labs, imaging, other:      Consults and findings:    Test Results after admission  Pertinent Lab tests (dates/results):  Culture results (blood, urine, spinal, wound, respiratory, etc ):  Imaging tests (dates/results):  EKG (dates/results):   Other test (dates/results):  Tests pending (dates/results):    Surgical or Invasive Procedures  Name of surgery/procedure:  Date & Time:  Patient Response:  Post-operative orders:  Operative Report/Findings:    Response to Treatment, Major Change in Condition, Major Charge in Treatment anytime during admission  Hospital Course:      Alma Sanchez is a 39 y o  male patient who originally presented to the hospital on 1/29/2021 due to increasing shortness of breath for 1 week left-sided chest discomfort associated chills productive cough and diaphoresis  His been to previous ERs twice in was a small effusion negative for PE and he was sent home  Now he has a moderate to large loculated effusion concern is for complicated rule out abscess rule out empyema  Patient is quite ill  He presented with sepsis antibiotics were changed to vanc and Zosyn prior to discharge he has white count  I discussed with critical care team the most appropriate approach is a chest tube which cannot be done here of that caliber and thrombolytics  He would need IR and for that reason he is going to be transferred to [facility] and may be down the road if these measures will not work would need involvement of cardiothoracic surgery  I discussed with IR Dr Yvan Sharp- will be doing the procedure  Npo has been placed  Patient accepted by SOD service of Dr Zenon Calles      Disposition on Discharge  Home, Rehab, SNF, LTC, Shelter, etc :  Transferred to [facility]  - [phone]    Cease to Breathe (CTB)  If a patient expires during an admission, in addition to the above information, please include:    Summary/timeline of the patient's decline in condition:    Medications and treatment:    Patient response to treatment:    Date and time patient ceased to breathe:        Is there a Readmission that follows this admission? Yes x No    If yes, provide dates:          InterQual Review    InterQual Criteria Met:  Yes x No  N/A        Please include the InterQual Review, InterQual year/version used, and the criteria selected:   Created Using Review Status Review Entered   [facility]  In Primary 1/30/2021 08:51       Criteria Set Name - Subset   LOC:Acute Adult-Infection: Pneumonia      Criteria Review   REVIEW SUMMARY     Patient: Alysa Horton  Review Number: [id number]  Review Status:  In Primary  Criteria Status: Not Met     Condition Specific: Yes        OUTCOMES  Outcome Type: Primary           REVIEW DETAILS     Product: Meredith Amandas Adult  Subset: Infection: Pneumonia      (Symptom or finding within 24h)         (Excludes PO medications unless noted)          Select Day, One:              [ ] Episode Day 1, One:                  [ ] ACUTE, All:                      [X] Intervention, All:                          [X] Anti-infective (includes PO)                          [X] Oxygenation, One:                              [X] O2 sat >= 92%(0 92) or baseline                          [X] Oximetry or blood gas                          [X] DVT prophylaxis or patient ambulatory     Version: Practice Management e-Tools 2020  [employer]ydney [employer]®  © 2020 [employer] and/or one of its [place]  All Rights Reserved  CPT only © 2019 [employer]  All Rights Reserved  PLEASE SUBMIT THE COMPLETED FORM TO THE DEPARTMENT OF HUMAN SERVICES - DIVISION OF CLINICAL  REVIEW VIA FAX [phone] or VIA E-MAIL TO [employer]    Signature: Yordy Veloz Date:  07/19/21    Confidentiality Notice: The documents accompanying this telecopy may contain confidential information belonging to the sender  The information is intended only for the use of the individual named above  If you are not the intended recipient, you are hereby notified  That any disclosure, copying, distribution or taking of any telecopy is strictly prohibited

## 2021-12-22 NOTE — UTILIZATION REVIEW
URGENT/EMERGENT  INPATIENT/SPU AUTHORIZATION REQUEST    Date: 12/22/21            # Pages in this Request:     X New Request   Additional Information for PA#:     Office Contact Name:  Eli Jane Title: Utilization Review, Registed Nurse     Phone: [phone]  Ext  Availability (Date/Time): Wednesday - Friday 8 am- 4 pm    x Inpatient Review  SPU Review        Current       x Late Pick-up   · How your facility was first notified of the Late Pick-up: Paths Letter   · When your facility was first notified of the Late Pick-up (date): 7/14/2021 ( Case was billed  And paid without an auth- Letter retracting payment received - sending for  administrative denial number)          RECIPIENT INFORMATION    Recipient ID#: [id number]   Recipient Name:  Paris Davis     YOB: 1975  55 y o  Recipient Alias:     Gender:  X Male  Female Medicaid Eligibility ([address] [place] [address]): INSURANCE INFORMATION    (All other private or governmental health insurance benefits must be utilized prior to billing the [place] Program)    Was this admission the result of an MVA, other accident, assault, injury, fall, gunshot, bite etc ? Yes X No                   If yes, provide a brief description of the incident  Does the recipient have other insurance coverage? Yes x No        Insurance Company Name/Policy #      Did that insurance pay on this claim? Yes  No        Did that insurance deny this claim? Yes  No    If yes, reason for denial:      Does the recipient have Medicare? Yes x No        Did Medicare exhaust prior to this admission? Yes  No        Did Medicare partially pay this claim? Yes  No        Did that insurance deny this claim? Yes  No    If yes, reason for denial:          Was the recipient a prisoner at the time of admission?   Yes x No            PROVIDER INFORMATION    Hospital Name:  [facility] Provider ID#: [id number]    [address] [place] [address] Physician Name: Mohan Everett Provider ID#: [id number]        ADMISSION INFORMATION    Type of Admission: (please choose one)     ED      Direct    If yes, from where? X Transfer    If yes, transferring hospital (inpatient, rehab, or psych) Provider Name/Provider ID#:  [facility] - [phone]    Admission Floor or Unit Type: Med Surg   Dates/Times:        ED Date/Time:         Observation Date/Time:         Admission Date/Time: 1/30/21  4:39 PM        Discharge or Transfer Date/Time: 2/3/2021  1:28 PM        DIAGNOSIS/PROCEDURE CODES    Primary Diagnosis Code/Primary Diagnosis Code description:  A41 9 Sepsis, unspecified organism   J18 9 Pneumonia, unspecified organism   J91 8 Pleural effusion in other conditions classified elsewhere   J98 11 Atelectasis    Additional Diagnosis Code(s) and Description(s)-(up to three additional codes):    Procedure Code (one) and description:  [id number] Drainage of L Pleural Cav with Drain Dev, Perc Approach        CLINICAL INFORMATION - PRIOR ADMISSION ONLY    Is there a prior admission with a discharge date within 30 days of the date of this admission? X No (Proceed to the next section - "Clinical Information - General Review Checklist:)      Yes (Provide the following information)     Prior admission dates:    MA Prior Authorization Number:        Review Outcome:     Diagnosis Code(s)/Description:    Procedure Code/Description:    Findings:    Treatment:    Condition on Discharge:   Vitals:    Labs:   Imaging:   Medications:     Follow-up Instructions:    Disposition:        CLINICAL INFORMATION - GENERAL REVIEW CHECKLIST    EMERGENCY DEPARTMENT: (Proceed to "ADMISSION" if Direct Admission)    Presenting Signs/Symptoms:    Medication/treatment prior to arrival in the ED:    Past Medical History:       Clinical Exam:    Initial Vital Signs: (Temp, Pulse, Resp, and BP)     Pertinent Repeat Vital Signs: (include times they were obtained)    Pertinent Sustained Findings: (include times they were obtained)    Weight in Kilograms:  Pertinent Labs (results):    Radiology (results):    EKG (results): Other tests (results):    Tests pending final results:    Treatment in the ED:      Other treatments:      Change in condition while in the ED:     Response to ED Treatment:          OBSERVATION: (Proceed to "ADMISSION" if Direct Admission)    Orders written during the observation period  Meds Name, dose, route, time, how may doses given:  PRN Meds Name, dose, route, time, how many doses given within the first 24 hrs :  IVs Type, rate, and total amt  ordered/given:  Labs, imaging, other:  Consults and findings:    Test Results during the observation period  Pertinent Lab tests (dates/results):  Culture results (blood, urine, spinal, wound, respiratory, etc ):  Imaging tests (dates/results):  EKG (dates/results): Other test (dates/results):  Tests pending (dates/results):    Surgical or Invasive Procedures during the observation period  Name of surgery/procedure:  Date & Time:  Patient Response:  Post-operative orders:  Operative Report/Findings:    Response to Treatment, Major Change in Condition, Major Charge in Treatment during the observation period          ADMISSION:    DIRECT Admissions Only:    Presenting Signs/Symptoms:   40 yo m w/hx goiter s/p radioactive iodine, provoked dvt transferred  By EMS from [address] unit to [address] [place] [address] surg unit, admitted as inpatient due to L pleural effusion  Initially presented to [address] [place] [address] on 1/29 due to worsening L chest pain/sob since 1/23, unimproved after zpak  Found to be be septic from loculated L pleural effusion, r/o empyema for which IV antbx were in progress  Decision made to transfer for higher LOC for chest tube by IR, which can't be done at [address] [place] [address] due to the caliber and thrombolytics  On arrival to [facility], c/o fever, chills, sweats, alejandre, L chest pain-worse with deep breath; mild bilat wheezing   IR consulted, double IV antbx in progress, keeping NPO, cultures pending     ·   ·   · Medication/treatment prior to arrival:  ·   Past Medical History:  Past Medical History:   Diagnosis Date    DVT (deep venous thrombosis) ([address] ) left leg    Hypertension     Hyperthyroidism     SIRS (systemic inflammatory response syndrome) (HCC)      ·   ·   · Clinical Exam on admission:  ·   · Vital Signs on admission: (Temp, Pulse, Resp, and BP)     Temperature Pulse Respirations Blood Pressure SpO2   01/30/21 1649 01/30/21 1649 01/30/21 1649 01/30/21 1649 01/30/21 1649   98 3 °F (36 8 °C) 105 22 147/98 91 %     Date/Time   Temp   Pulse   Resp   BP   MAP (mmHg)   SpO2     Nasal Cannula O2 Flow Rate (L/min)     01/31/21 15:38:11   --   113Abnormal    19   121/84   96   93 %     --     01/31/21 08:02:26   --   --   --   132/60   84   --     --     01/31/21 07:58:16   --   90   20   126/91   103   100 %     --     01/31/21 04:04:39   --   95   --   124/80   95   93 %     --     01/31/21 0341   --   --   --   --   --   --     1 L/min     01/31/21 0300   --   91   --   125/86   99   92 %     --     01/31/21 0200   --   91   --   126/88   101   92 %     --     01/31/21 0100   --   94   --   127/87   100   93 %     1 L/min     01/31/21 0030   --   96   --   123/84   97   92 %     --     01/31/21 0000   --   96   --   126/84   98   92 %     --     01/30/21 2330   --   100   --   127/82   97   92 %     --     01/30/21 2300   --   96   --   132/82   99   90 %     --     01/30/21 22:30:40   --   111Abnormal    --   136/81   99   92 %     --     01/30/21 2214   --   113Abnormal    --   128/94   105   92 %     --     01/30/21 2159   --   106Abnormal    --   129/95   106   91 %     --     01/30/21 2144   --   117Abnormal    --   129/96   107   91 %     --     01/30/21 2129   --   110Abnormal    --   126/94   105   90 %     --     01/30/21 2113   --   109Abnormal    --   123/91   102   92 %     --     01/30/21 20:35:17   --   122Abnormal    --   123/86   98   91 %     --     01/30/21 2014   --   118Abnormal    18   144/79   103   96 %     --     01/30/21 2009   --   120Abnormal    18   162/86   113   96 %     2 L/min     01/30/21 2004   --   124Abnormal    18   158/100   122   97 %     2 L/min     01/30/21 2000   98 1 °F (36 7 °C)   --   --   --   --   --     --     01/30/21 1959   --   128Abnormal    18   159/103Abnormal    124   97 %     2 L/min     01/30/21 1954   --   119Abnormal    18   123/91   104   92 %     --          · Weight in kilograms:     ALL Admissions:    Admission Orders and Other Orders written within the first 24 hrs after admission    chest tube to suction  scd's  Keep sat>92%  House diet         Meds Name, dose, route, time, how may doses given:    acetaminophen, 975 mg, Oral, Q8H [address]  cefTRIAXone, 1,000 mg, Intravenous, Q24H    And  doxycycline, 100 mg, Intravenous, Q12H  enoxaparin, 40 mg, Subcutaneous, Q24H [address]  levothyroxine, 25 mcg, Oral, Early Morning  [START ON 2/1/2021] nicotine, 21 mg, Transdermal, Daily  IV dilaudid x 1 1/31 @1148     piperacillin-tazobactam (ZOSYN) 3 375 g in sodium chloride 0 9 % 100 mL IVPB   Dose: 3 375 g  Freq: Every 6 hours Route: IV  Last Dose: 3 375 g (01/31/21 0731)  Start: 01/30/21 1900 End: 01/31/21 1106     vancomycin (VANCOCIN) 1,250 mg in sodium chloride 0 9 % 250 mL IVPB   Dose: 15 mg/kg  Weight Dosing Info: 91 kg (Adjusted)  Freq: Every 8 hours Route: IV  Last Dose: 1,250 mg (01/31/21 0512)  Start: 01/30/21 2130 End: 01/31/21 1106         PRN Meds Name, dose, route, time, how many doses given within the first 24 hrs :    albuterol, 2 puff, Inhalation, Q4H PRN  docusate sodium, 100 mg, Oral, BID PRN  HYDROmorphone, 0 5 mg, Intravenous, Q4H PRN x 1 1/31 @1256  HYDROmorphone, 1 mg, Intravenous, Q4H PRN  ondansetron, 4 mg, Intravenous, Q4H PRN  PO tylenol x 1 1/30, x 1 1 /31  PO oxycodone x 1 1/31       IVs Type, rate, and total amt   Ordered/given:  sodium chloride, 100 mL/hr, Intravenous, Continuous     sodium chloride 0 9 % with KCl 20 mEq/L infusion (premix)   Rate: 100 mL/hr Dose: 100 mL/hr  Freq: Continuous Route: IV  Last Dose: Stopped (01/31/21 1355)  Start: 01/30/21 1815 End: 01/31/21 1246        Labs, imaging, other:  1/29 @[facility]:  PCXR: Moderate size left pleural effusion   No pneumothorax  CT chest: 1   Moderate to large loculated left pleural effusion  2   Compressive atelectasis throughout the left lung   No evidence of significant underlying airspace consolidation to suggest pneumonia  EKG: sinus tach, incomplete LBBB     At [facility]:  1/31 PCXR: Decreased left pleural effusion with subjacent compressive atelectasis    Consults and findings:    Test Results after admission  Pertinent Lab tests (dates/results):  Results from last 7 days   Lab Units 01/29/21  1722   SARS-COV-2   Negative              Results from last 7 days   Lab Units 01/31/21  1451 01/31/21  0534 01/30/21  0558 01/29/21  1654   WBC Thousand/uL 13 80* 9 57 10 34* 13 03*   HEMOGLOBIN g/dL 15 2 12 4 12 8 14 2   HEMATOCRIT % 46 1 37 9 38 9 42 6   PLATELETS Thousands/uL 438* 378 382 425*   NEUTROS ABS Thousands/µL 11 12* 6 62 7 70* 10 55*                 Results from last 7 days   Lab Units 01/31/21  0534 01/30/21  0558 01/29/21  1654   SODIUM mmol/L 137 133* 135*   POTASSIUM mmol/L 3 7 3 7 3 5   CHLORIDE mmol/L 106 101 97*   CO2 mmol/L 24 18* 24   ANION GAP mmol/L 7 14* 14*   BUN mg/dL 10 12 15   CREATININE mg/dL 0 85 0 88 1 16   EGFR ml/min/1 73sq m 105 104 76   CALCIUM mg/dL 9 2 8 3 9 3   MAGNESIUM mg/dL  --  1 8  --    PHOSPHORUS mg/dL  --  2 4*  --             Results from last 7 days   Lab Units 01/30/21  0558 01/29/21  1654   AST U/L 13 13   ALT U/L 19 24   ALK PHOS U/L 102 119*   TOTAL PROTEIN g/dL 7 0 8 5*   ALBUMIN g/dL 2 2* 3 0*   TOTAL BILIRUBIN mg/dL 0 62 0 89                 Results from last 7 days   Lab Units 01/31/21  0534 01/30/21  0558 01/29/21  1654   GLUCOSE RANDOM mg/dL 65 79 101             Results from last 7 days   Lab Units 01/31/21  1207 01/29/21  1839   TROPONIN I ng/mL <0 02 <0 02               Results from last 7 days   Lab Units 01/30/21  0558   PROTIME seconds 13 4   INR   1 04           Results from last 7 days   Lab Units 01/29/21  1654   TSH 3RD GENERATON uIU/mL 8 524*           Results from last 7 days   Lab Units 01/29/21  1839   PROCALCITONIN ng/ml 0 42*           Results from last 7 days   Lab Units 01/29/21  1654   LACTIC ACID mmol/L 1 1           Results from last 7 days   Lab Units 01/29/21  1654   NT-PRO BNP pg/mL 26               Results from last 7 days   Lab Units 01/29/21  1654   CRP mg/L 370 5*   SED RATE mm/hour 130*               Results from last 7 days   Lab Units 01/30/21  1417   CLARITY UA   Clear   COLOR UA   Michelle   SPEC GRAV UA   1 015   PH UA   6 5   GLUCOSE UA mg/dl Negative   KETONES UA mg/dl 40 (2+)*   BLOOD UA   Negative   PROTEIN UA mg/dl Trace*   NITRITE UA   Negative   BILIRUBIN UA   Small*   UROBILINOGEN UA E U /dl 2 0*   LEUKOCYTES UA   Negative   WBC UA /hpf None Seen   RBC UA /hpf None Seen   BACTERIA UA /hpf None Seen   EPITHELIAL CELLS WET PREP /hpf Occasional      Results from last 7 days   Lab Units 01/30/21  1417 01/29/21  1722   STREP PNEUMONIAE ANTIGEN, URINE   Negative  --    LEGIONELLA URINARY ANTIGEN   Negative  --    INFLUENZA A PCR    --  Negative   INFLUENZA B PCR    --  Negative   RSV PCR    --  Negative               Results from last 7 days   Lab Units 01/30/21  1417   AMPH/METH   Negative   BARBITURATE UR   Negative   BENZODIAZEPINE UR   Negative   COCAINE UR   Negative   METHADONE URINE   Negative   OPIATE UR   Positive*   PCP UR   Negative   THC UR   Positive*             Results from last 7 days   Lab Units 01/30/21  2016 01/29/21  1654   BLOOD CULTURE    --  No Growth at 24 hrs  No Growth at 24 hrs     GRAM STAIN RESULT   1+ Polys  No bacteria seen  --            Results from last 7 days   Lab Units 01/30/21 2015   TOTAL COUNTED   100   WBC FLUID /ul 6,658            Culture results (blood, urine, spinal, wound, respiratory, etc ):  Imaging tests (dates/results):  EKG (dates/results): Other test (dates/results):  Tests pending (dates/results):    Surgical or Invasive Procedures  Name of surgery/procedure: Left Chest Tube Placement ( interventional radiology )   Date & Time: 1/30/2021 @ 8:17  Patient Response: tolerated   Post-operative orders: same   Operative Report/Findings: 12 [place] chest tube placed under ultrasound guidance  40 mL cloudy yellow fluid removed  Fluid appeared mildly complex under ultrasound  No tPA/Pulmonase today 2 to risk of bleeding immediately after chest tube placement, but will administer tomorrow    Response to Treatment, Major Change in Condition, Major Charge in Treatment anytime during admission  1/30 per IR: rapidly enlarging L pleural effusion, 89-91%RA sat, plan for image guided chest tube  If poor output or if effusion looks very loculated, consider TPA       To IR 1/30 @2017 for L chest tube placement under conscious sedation  40ml cloudy yellow fluid removed, appeared mildly complex under US  No TPA due to bleeding risk, but will give tomorrow       1/31: POD#1, effusion was complex as expected  Only 28ml drained overnight  TPA given via chest tube @1005  IV antbx changed to rocephin and doxy  If chest tube output does not improve, will need CT surgery consult  Breathing improved  PM Update: c/o chest pain after TPA, stat CXR showed no pneumo  Tachy, bilateral breath sounds present, dilaudid given with improvement  Trop/EKG negative  o2 initially increased, but no resp distress  Checking CBC due to 'a lot' of bloody drainage from chest tube  Hospital Course -  Patient is a 42-year-old with PMH significant for hypothyroidism s/p order disease at age 16 with radioactive iodine ablation, provoked DVT in 2010, and mild intermittent asthma who was transferred to [facility] from [facility] on 1/30    Around a week prior on 01/23, the patient expressed onset of subjective fever/chills, increasing shortness of breath, left-sided chest discomfort, and minimally productive cough  At that time, he had suspected a COVID infection given his girlfriend tested positive days earlier  He went to [facility] on January 23rd and 27th for ED evaluation  Although COVID PCR was negative, the patient was given a course of azithromycin for presumptive community-acquired pneumonia  At the 2nd visit, CT chest was performed and revealed a small left pleural effusion--he was sent home on symptomatic treatment  Symptoms worsened for the patient, and he presented to [address] [place] [address] on 01/29  CT scan was repeated, which revealed a moderate to large loculated pleural effusion with concern for abscess or empyema  Based on is rule presentation, the patient was septic and started on broad-spectrum antibiotics vancomycin and Zosyn  Given his complicated parapneumonic pleural effusion, he was transferred to [facility] under the care of [facility] for IR evaluation and treatment for chest tube plus/minus thrombolytics      Immediately upon transfer on 01/30, IR placed left chest tube and administered tPA/Dornase on 1/31 (drained approximately 2 5L)  Effusion studies showed evidence of exudative pleural effusion and he was subsequently switched to ceftriaxone and doxycycline  Throughout his admission, patient was given supplemental O2 and pain control while the primary team followed on infectious studies:  No growth on blood cultures 2x, negative HIV, negative strep pneumo, negative Legionella  On 02/01, a PICC line was placed for better IV access and follow-up imaging was done to monitor the status of the pleural effusion: showed a significant reduction  To better cover for anaerobic organisms, his regimen was modified to ceftriaxone and metronidazole that same day    On 02/02, the patient's condition greatly improved and was weaned off of oxygen, chest tube was removed; medically cleared for discharge  He was evaluated for home O2 on 02/03, but was ineligible and discharged home that same day  Disposition on Discharge  Home, Rehab, SNF, LTC, Shelter, etc : Home/Self Care    Cease to Breathe (CTB)  If a patient expires during an admission, in addition to the above information, please include:    Summary/timeline of the patient's decline in condition:    Medications and treatment:    Patient response to treatment:    Date and time patient ceased to breathe:        Is there a Readmission that follows this admission? Yes x No    If yes, provide dates:          InterQual Review    InterQual Criteria Met:  Yes X No  N/A        Please include the InterQual Review, InterQual year/version used, and the criteria selected:   Created Using Review Status Review Entered   ReSnap  In Primary 1/31/2021 16:53       Criteria Set Name - Subset   LOC:Acute Adult-Infection: Pneumonia      Criteria Review   REVIEW SUMMARY     Patient: Marylu Yung  Review Number: [id number]  Review Status:  In Primary  Criteria Status: Not Met     Condition Specific: Yes        OUTCOMES  Outcome Type: Primary           REVIEW DETAILS     Product: Tagg[employer] Adult  Subset: Infection: Pneumonia      (Symptom or finding within 24h)         (Excludes PO medications unless noted)          Select Day, One:              [ ] Episode Day 1, One:                  [ ] ACUTE, All:                      [X] Finding, >= One:                          [X] Empyema                      [X] Intervention, All:                          [X] Anti-infective (includes PO)                          [X] Oxygenation, One:                              [X] O2 sat <= 91%(0 91) and < baseline requiring supplemental oxygen                          [X] Oximetry or blood gas                          [X] DVT prophylaxis or patient ambulatory     Version: ReSnap 2020  Melba Weaver  © [employer]17 [employer] and/or one of its subsidiaries  All Rights Reserved  CPT only © 2019 [employer]  All Rights Reserved  PLEASE SUBMIT THE COMPLETED FORM TO THE DEPARTMENT OF HUMAN SERVICES - DIVISION OF CLINICAL  REVIEW VIA FAX -[phone] or VIA E-MAIL TO [email or web address]    Signature: Verito Sit Date:  12/22/21    Confidentiality Notice: The documents accompanying this telecopy may contain confidential information belonging to the sender  The information is intended only for the use of the individual named above  If you are not the intended recipient, you are hereby notified  That any disclosure, copying, distribution or taking of any telecopy is strictly prohibited

## 2022-09-18 PROCEDURE — 99285 EMERGENCY DEPT VISIT HI MDM: CPT

## 2022-09-19 ENCOUNTER — APPOINTMENT (OUTPATIENT)
Dept: RADIOLOGY | Facility: HOSPITAL | Age: 47
End: 2022-09-19
Payer: COMMERCIAL

## 2022-09-19 ENCOUNTER — HOSPITAL ENCOUNTER (EMERGENCY)
Facility: HOSPITAL | Age: 47
Discharge: HOME | End: 2022-09-19
Attending: EMERGENCY MEDICINE
Payer: COMMERCIAL

## 2022-09-19 VITALS
SYSTOLIC BLOOD PRESSURE: 128 MMHG | TEMPERATURE: 100.2 F | WEIGHT: 206.79 LBS | BODY MASS INDEX: 28.01 KG/M2 | HEIGHT: 72 IN | OXYGEN SATURATION: 89 % | DIASTOLIC BLOOD PRESSURE: 88 MMHG | RESPIRATION RATE: 20 BRPM | HEART RATE: 97 BPM

## 2022-09-19 DIAGNOSIS — J45.41 MODERATE PERSISTENT ASTHMA WITH EXACERBATION: Primary | ICD-10-CM

## 2022-09-19 LAB
ALBUMIN SERPL BCP-MCNC: 4.2 G/DL (ref 3.5–5)
ALP SERPL-CCNC: 67 U/L (ref 46–116)
ALT SERPL W P-5'-P-CCNC: 15 U/L (ref 12–78)
ANION GAP SERPL CALCULATED.3IONS-SCNC: 11 MMOL/L (ref 4–13)
AST SERPL W P-5'-P-CCNC: 23 U/L (ref 5–45)
BASOPHILS # BLD AUTO: 0.05 THOUSANDS/ΜL (ref 0–0.1)
BASOPHILS NFR BLD AUTO: 1 % (ref 0–1)
BILIRUB SERPL-MCNC: 0.56 MG/DL (ref 0.2–1)
BUN SERPL-MCNC: 9 MG/DL (ref 5–25)
CA-I BLD-SCNC: 1.18 MMOL/L (ref 1.12–1.32)
CALCIUM SERPL-MCNC: 9.4 MG/DL (ref 8.3–10.1)
CARDIAC TROPONIN I PNL SERPL HS: <2 NG/L
CHLORIDE SERPL-SCNC: 104 MMOL/L (ref 96–108)
CK MB SERPL-MCNC: 2 % (ref 0–2.5)
CK MB SERPL-MCNC: 5.3 NG/ML (ref 0–5)
CK SERPL-CCNC: 270 U/L (ref 39–308)
CO2 SERPL-SCNC: 24 MMOL/L (ref 21–32)
CREAT SERPL-MCNC: 1.15 MG/DL (ref 0.6–1.3)
CRP SERPL QL: 1.4 MG/L
D DIMER PPP FEU-MCNC: 0.38 UG/ML FEU
EOSINOPHIL # BLD AUTO: 0.64 THOUSAND/ΜL (ref 0–0.61)
EOSINOPHIL NFR BLD AUTO: 7 % (ref 0–6)
ERYTHROCYTE [DISTWIDTH] IN BLOOD BY AUTOMATED COUNT: 13.1 % (ref 11.6–15.1)
FLUAV RNA RESP QL NAA+PROBE: NEGATIVE
FLUBV RNA RESP QL NAA+PROBE: NEGATIVE
GFR SERPL CREATININE-BSD FRML MDRD: 75 ML/MIN/1.73SQ M
GLUCOSE SERPL-MCNC: 85 MG/DL (ref 65–140)
HCT VFR BLD AUTO: 41.6 % (ref 36.5–49.3)
HGB BLD-MCNC: 14.2 G/DL (ref 12–17)
IMM GRANULOCYTES # BLD AUTO: 0.03 THOUSAND/UL (ref 0–0.2)
IMM GRANULOCYTES NFR BLD AUTO: 0 % (ref 0–2)
INR PPP: 0.96 (ref 0.84–1.19)
LACTATE SERPL-SCNC: 0.8 MMOL/L (ref 0.5–2)
LYMPHOCYTES # BLD AUTO: 2.24 THOUSANDS/ΜL (ref 0.6–4.47)
LYMPHOCYTES NFR BLD AUTO: 24 % (ref 14–44)
MAGNESIUM SERPL-MCNC: 1.8 MG/DL (ref 1.6–2.6)
MCH RBC QN AUTO: 31.1 PG (ref 26.8–34.3)
MCHC RBC AUTO-ENTMCNC: 34.1 G/DL (ref 31.4–37.4)
MCV RBC AUTO: 91 FL (ref 82–98)
MONOCYTES # BLD AUTO: 0.75 THOUSAND/ΜL (ref 0.17–1.22)
MONOCYTES NFR BLD AUTO: 8 % (ref 4–12)
NEUTROPHILS # BLD AUTO: 5.68 THOUSANDS/ΜL (ref 1.85–7.62)
NEUTS SEG NFR BLD AUTO: 60 % (ref 43–75)
NRBC BLD AUTO-RTO: 0 /100 WBCS
NT-PROBNP SERPL-MCNC: 102 PG/ML
PLATELET # BLD AUTO: 324 THOUSANDS/UL (ref 149–390)
PMV BLD AUTO: 9.9 FL (ref 8.9–12.7)
POTASSIUM SERPL-SCNC: 3.7 MMOL/L (ref 3.5–5.3)
PROCALCITONIN SERPL-MCNC: <0.05 NG/ML
PROT SERPL-MCNC: 7.9 G/DL (ref 6.4–8.4)
PROTHROMBIN TIME: 12.8 SECONDS (ref 11.6–14.5)
RBC # BLD AUTO: 4.56 MILLION/UL (ref 3.88–5.62)
RSV RNA RESP QL NAA+PROBE: NEGATIVE
SARS-COV-2 RNA RESP QL NAA+PROBE: NEGATIVE
SODIUM SERPL-SCNC: 139 MMOL/L (ref 135–147)
WBC # BLD AUTO: 9.39 THOUSAND/UL (ref 4.31–10.16)

## 2022-09-19 PROCEDURE — 83735 ASSAY OF MAGNESIUM: CPT | Performed by: EMERGENCY MEDICINE

## 2022-09-19 PROCEDURE — 85379 FIBRIN DEGRADATION QUANT: CPT | Performed by: EMERGENCY MEDICINE

## 2022-09-19 PROCEDURE — 36415 COLL VENOUS BLD VENIPUNCTURE: CPT | Performed by: EMERGENCY MEDICINE

## 2022-09-19 PROCEDURE — 87040 BLOOD CULTURE FOR BACTERIA: CPT | Performed by: EMERGENCY MEDICINE

## 2022-09-19 PROCEDURE — 84145 PROCALCITONIN (PCT): CPT | Performed by: EMERGENCY MEDICINE

## 2022-09-19 PROCEDURE — 82550 ASSAY OF CK (CPK): CPT | Performed by: EMERGENCY MEDICINE

## 2022-09-19 PROCEDURE — 96365 THER/PROPH/DIAG IV INF INIT: CPT

## 2022-09-19 PROCEDURE — 84484 ASSAY OF TROPONIN QUANT: CPT | Performed by: EMERGENCY MEDICINE

## 2022-09-19 PROCEDURE — 82330 ASSAY OF CALCIUM: CPT | Performed by: EMERGENCY MEDICINE

## 2022-09-19 PROCEDURE — 82553 CREATINE MB FRACTION: CPT | Performed by: EMERGENCY MEDICINE

## 2022-09-19 PROCEDURE — 94640 AIRWAY INHALATION TREATMENT: CPT

## 2022-09-19 PROCEDURE — 85025 COMPLETE CBC W/AUTO DIFF WBC: CPT | Performed by: EMERGENCY MEDICINE

## 2022-09-19 PROCEDURE — 99285 EMERGENCY DEPT VISIT HI MDM: CPT | Performed by: EMERGENCY MEDICINE

## 2022-09-19 PROCEDURE — 83880 ASSAY OF NATRIURETIC PEPTIDE: CPT | Performed by: EMERGENCY MEDICINE

## 2022-09-19 PROCEDURE — 96375 TX/PRO/DX INJ NEW DRUG ADDON: CPT

## 2022-09-19 PROCEDURE — 86140 C-REACTIVE PROTEIN: CPT | Performed by: EMERGENCY MEDICINE

## 2022-09-19 PROCEDURE — 80053 COMPREHEN METABOLIC PANEL: CPT | Performed by: EMERGENCY MEDICINE

## 2022-09-19 PROCEDURE — 85610 PROTHROMBIN TIME: CPT | Performed by: EMERGENCY MEDICINE

## 2022-09-19 PROCEDURE — 0241U HB NFCT DS VIR RESP RNA 4 TRGT: CPT | Performed by: EMERGENCY MEDICINE

## 2022-09-19 PROCEDURE — 71045 X-RAY EXAM CHEST 1 VIEW: CPT

## 2022-09-19 PROCEDURE — 83605 ASSAY OF LACTIC ACID: CPT | Performed by: EMERGENCY MEDICINE

## 2022-09-19 RX ORDER — ALBUTEROL SULFATE 90 UG/1
2 AEROSOL, METERED RESPIRATORY (INHALATION) ONCE
Status: COMPLETED | OUTPATIENT
Start: 2022-09-19 | End: 2022-09-19

## 2022-09-19 RX ORDER — ALBUTEROL SULFATE 2.5 MG/3ML
2.5 SOLUTION RESPIRATORY (INHALATION) ONCE
Status: COMPLETED | OUTPATIENT
Start: 2022-09-19 | End: 2022-09-19

## 2022-09-19 RX ORDER — PREDNISONE 20 MG/1
TABLET ORAL
Qty: 20 TABLET | Refills: 0 | Status: SHIPPED | OUTPATIENT
Start: 2022-09-19 | End: 2022-10-01

## 2022-09-19 RX ORDER — ACETAMINOPHEN 325 MG/1
650 TABLET ORAL ONCE
Status: COMPLETED | OUTPATIENT
Start: 2022-09-19 | End: 2022-09-19

## 2022-09-19 RX ORDER — DEXAMETHASONE SODIUM PHOSPHATE 4 MG/ML
6 INJECTION, SOLUTION INTRA-ARTICULAR; INTRALESIONAL; INTRAMUSCULAR; INTRAVENOUS; SOFT TISSUE ONCE
Status: COMPLETED | OUTPATIENT
Start: 2022-09-19 | End: 2022-09-19

## 2022-09-19 RX ORDER — ALBUTEROL SULFATE 90 UG/1
2 AEROSOL, METERED RESPIRATORY (INHALATION) EVERY 4 HOURS PRN
Qty: 18 G | Refills: 2 | Status: SHIPPED | OUTPATIENT
Start: 2022-09-19

## 2022-09-19 RX ORDER — IPRATROPIUM BROMIDE AND ALBUTEROL SULFATE 2.5; .5 MG/3ML; MG/3ML
3 SOLUTION RESPIRATORY (INHALATION) ONCE
Status: COMPLETED | OUTPATIENT
Start: 2022-09-19 | End: 2022-09-19

## 2022-09-19 RX ORDER — MAGNESIUM SULFATE HEPTAHYDRATE 40 MG/ML
2 INJECTION, SOLUTION INTRAVENOUS ONCE
Status: COMPLETED | OUTPATIENT
Start: 2022-09-19 | End: 2022-09-19

## 2022-09-19 RX ADMIN — ACETAMINOPHEN 650 MG: 325 TABLET ORAL at 00:41

## 2022-09-19 RX ADMIN — DEXAMETHASONE SODIUM PHOSPHATE 6 MG: 4 INJECTION, SOLUTION INTRAMUSCULAR; INTRAVENOUS at 00:51

## 2022-09-19 RX ADMIN — ALBUTEROL SULFATE 2.5 MG: 2.5 SOLUTION RESPIRATORY (INHALATION) at 00:41

## 2022-09-19 RX ADMIN — ALBUTEROL SULFATE 2 PUFF: 90 AEROSOL, METERED RESPIRATORY (INHALATION) at 02:56

## 2022-09-19 RX ADMIN — MAGNESIUM SULFATE HEPTAHYDRATE 2 G: 40 INJECTION, SOLUTION INTRAVENOUS at 01:45

## 2022-09-19 RX ADMIN — IPRATROPIUM BROMIDE AND ALBUTEROL SULFATE 3 ML: 2.5; .5 SOLUTION RESPIRATORY (INHALATION) at 00:41

## 2022-09-19 RX ADMIN — ALBUTEROL SULFATE 2.5 MG: 2.5 SOLUTION RESPIRATORY (INHALATION) at 01:45

## 2022-09-19 NOTE — ED PROVIDER NOTES
History  Chief Complaint   Patient presents with    Shortness of Breath     Pt reprots SOB for the last week, was seen at [facility] ER and sent home, pt reports increasing work of breathing and chest tightness      Patient is a 51-year-old male presenting to the emergency department complaining of chest tightness, shortness of breath and wheezing, patient has a history of asthma, he is currently a smoker, smoking almost a pack a day at this point, reports symptoms have been going on intermittently at least for the past month, he recently ran out of his albuterol inhaler, he is not currently on any steroids, he denies any chest pain, no fever or chills, cough is productive of clear phlegm at times, he denies any sick contacts          Prior to Admission Medications   Prescriptions Last Dose Informant Patient Reported? Taking? Acetaminophen (Tylenol) 325 MG CAPS   No No   Sig: Take 1 capsule (325 mg total) by mouth 4 (four) times a day as needed (for pain)   albuterol (PROVENTIL HFA,VENTOLIN HFA) 90 mcg/act inhaler   No No   Sig: Inhale 2 puffs every 4 (four) hours as needed for wheezing   docusate sodium (COLACE) 100 mg capsule   No No   Sig: Take 1 capsule (100 mg total) by mouth 2 (two) times a day as needed for constipation   levothyroxine 25 mcg tablet   No No   Sig: Take 1 tablet (25 mcg total) by mouth daily in the early morning   nicotine (NICODERM CQ) 21 mg/24 hr TD 24 hr patch   No No   Sig: Place 1 patch on the skin daily   nicotine polacrilex (NICORETTE) 2 mg gum   No No   Sig: For strong cravings, take 8-10 pieces (2mg) of gum per day  For weaker cravings, take 3-6 pieces of gum per day        Facility-Administered Medications: None       Past Medical History:   Diagnosis Date    Acquired hypothyroidism     DVT (deep venous thrombosis) (HCC) left leg    History of DVT (deep vein thrombosis)     Hypertension     Hyperthyroidism     Pleural effusion, left 1/29/2021    [facility]     Tobacco abuse        Past Surgical History:   Procedure Laterality Date    FRACTURE SURGERY      IR CHEST TUBE PLACEMENT  1/30/2021       History reviewed  No pertinent family history  I have reviewed and agree with the history as documented  E-Cigarette/Vaping    E-Cigarette Use Never User      E-Cigarette/Vaping Substances     Social History     Tobacco Use    Smoking status: Current Every Day Smoker     Packs/day: 0 50    Smokeless tobacco: Never Used   Vaping Use    Vaping Use: Never used   Substance Use Topics    Alcohol use: Not Currently    Drug use: Yes     Types: Marijuana       Review of Systems   Constitutional: Negative  HENT: Negative  Eyes: Negative  Respiratory: Positive for cough, chest tightness, shortness of breath and wheezing  Cardiovascular: Negative  Gastrointestinal: Negative  Endocrine: Negative  Genitourinary: Negative  Musculoskeletal: Negative  Skin: Negative  Allergic/Immunologic: Negative  Neurological: Negative  Hematological: Negative  Psychiatric/Behavioral: Negative  Physical Exam  Physical Exam  Constitutional:       Appearance: He is well-developed  He is ill-appearing  HENT:      Head: Normocephalic and atraumatic  Eyes:      Conjunctiva/sclera: Conjunctivae normal       Pupils: Pupils are equal, round, and reactive to light  Cardiovascular:      Rate and Rhythm: Normal rate and regular rhythm  Pulmonary:      Effort: Accessory muscle usage present  Breath sounds: Examination of the right-upper field reveals wheezing  Examination of the left-upper field reveals wheezing  Examination of the right-middle field reveals wheezing  Examination of the left-middle field reveals wheezing  Examination of the right-lower field reveals wheezing  Examination of the left-lower field reveals wheezing  Wheezing present  Abdominal:      Palpations: Abdomen is soft     Musculoskeletal: General: Normal range of motion  Cervical back: Normal range of motion and neck supple  Skin:     General: Skin is warm and dry  Neurological:      Mental Status: He is alert and oriented to person, place, and time           Vital Signs  ED Triage Vitals   Temperature Pulse Respirations Blood Pressure SpO2   09/19/22 0006 09/19/22 0006 09/19/22 0006 09/19/22 0006 09/19/22 0006   100 2 °F (37 9 °C) (!) 114 20 (!) 160/102 91 %      Temp Source Heart Rate Source Patient Position - Orthostatic VS BP Location FiO2 (%)   09/19/22 0006 09/19/22 0006 09/19/22 0030 09/19/22 0030 --   Temporal Monitor Sitting Left arm       Pain Score       --                  Vitals:    09/19/22 0100 09/19/22 0130 09/19/22 0200 09/19/22 0230   BP: 146/99 146/92 129/93 128/88   Pulse: 101 94 93 97   Patient Position - Orthostatic VS: Sitting  Sitting                ED Medications  Medications   dexamethasone (DECADRON) injection 6 mg (6 mg Intravenous Given 9/19/22 0051)   ipratropium-albuterol (DUO-NEB) 0 5-2 5 mg/3 mL inhalation solution 3 mL (3 mL Nebulization Given 9/19/22 0041)   albuterol inhalation solution 2 5 mg (2 5 mg Nebulization Given 9/19/22 0041)   acetaminophen (TYLENOL) tablet 650 mg (650 mg Oral Given 9/19/22 0041)   magnesium sulfate 2 g/50 mL IVPB (premix) 2 g (0 g Intravenous Stopped 9/19/22 0253)   albuterol inhalation solution 2 5 mg (2 5 mg Nebulization Given 9/19/22 0145)   albuterol (PROVENTIL HFA,VENTOLIN HFA) inhaler 2 puff (2 puffs Inhalation Given 9/19/22 0256)       Diagnostic Studies  Results Reviewed     Procedure Component Value Units Date/Time    C-reactive protein [[id number]]  (Normal) Collected: 09/19/22 0048    Lab Status: Final result Specimen: Blood from Arm, Left Updated: 09/19/22 0249     CRP 1 4 mg/L     CKMB [[id number]]  (Abnormal) Collected: 09/19/22 0048    Lab Status: Final result Specimen: Blood from Arm, Left Updated: 09/19/22 0249     CK-MB Index 2 0 %      CK-MB 5 3 ng/mL FLU/RSV/COVID - if FLU/RSV clinically relevant [[id number]]  (Normal) Collected: 09/19/22 0048    Lab Status: Final result Specimen: Nares from Nose Updated: 09/19/22 0140     SARS-CoV-2 Negative     INFLUENZA A PCR Negative     INFLUENZA B PCR Negative     RSV PCR Negative    Narrative:      FOR PEDIATRIC PATIENTS - copy/paste COVID Guidelines URL to browser: [email or web address]  [employer]    SARS-CoV-2 assay is a Nucleic Acid Amplification assay intended for the  qualitative detection of nucleic acid from SARS-CoV-2 in nasopharyngeal  swabs  Results are for the presumptive identification of SARS-CoV-2 RNA  Positive results are indicative of infection with SARS-CoV-2, the virus  causing COVID-19, but do not rule out bacterial infection or co-infection  with other viruses  Laboratories within the [place] and its  territories are required to report all positive results to the appropriate  public health authorities  Negative results do not preclude SARS-CoV-2  infection and should not be used as the sole basis for treatment or other  patient management decisions  Negative results must be combined with  clinical observations, patient history, and epidemiological information  This test has not been FDA cleared or approved  This test has been authorized by FDA under an Emergency Use Authorization  (EUA)  This test is only authorized for the duration of time the  declaration that circumstances exist justifying the authorization of the  emergency use of an in vitro diagnostic tests for detection of SARS-CoV-2  virus and/or diagnosis of COVID-19 infection under section 564(b)(1) of  the Act, 21 U  S C  570GML-4(L)(0), unless the authorization is terminated  or revoked sooner  The test has been validated but independent review by FDA  and CLIA is pending  Test performed using [email or web address]: This RT-PCR assay targets N2,  a region unique to SARS-CoV-2   A conserved region in the E-gene was chosen  for pan-Sarbecovirus detection which includes SARS-CoV-2      Procalcitonin [[id number]]  (Normal) Collected: 09/19/22 0048    Lab Status: Final result Specimen: Blood from Arm, Left Updated: 09/19/22 0126     Procalcitonin <0 05 ng/ml     Comprehensive metabolic panel [[id number]] Collected: 09/19/22 0048    Lab Status: Final result Specimen: Blood from Arm, Left Updated: 09/19/22 0125     Sodium 139 mmol/L      Potassium 3 7 mmol/L      Chloride 104 mmol/L      CO2 24 mmol/L      ANION GAP 11 mmol/L      BUN 9 mg/dL      Creatinine 1 15 mg/dL      Glucose 85 mg/dL      Calcium 9 4 mg/dL      AST 23 U/L      ALT 15 U/L      Alkaline Phosphatase 67 U/L      Total Protein 7 9 g/dL      Albumin 4 2 g/dL      Total Bilirubin 0 56 mg/dL      eGFR 75 ml/min/1 73sq m     Narrative:      [place] guidelines for Chronic Kidney Disease (CKD):     Stage 1 with normal or high GFR (GFR > 90 mL/min/1 73 square meters)    Stage 2 Mild CKD (GFR = 60-89 mL/min/1 73 square meters)    Stage 3A Moderate CKD (GFR = 45-59 mL/min/1 73 square meters)    Stage 3B Moderate CKD (GFR = 30-44 mL/min/1 73 square meters)    Stage 4 Severe CKD (GFR = 15-29 mL/min/1 73 square meters)    Stage 5 End Stage CKD (GFR <15 mL/min/1 73 square meters)  Note: GFR calculation is accurate only with a steady state creatinine    Magnesium [[id number]]  (Normal) Collected: 09/19/22 0048    Lab Status: Final result Specimen: Blood from Arm, Left Updated: 09/19/22 0125     Magnesium 1 8 mg/dL     NT-BNP PRO [[id number]]  (Normal) Collected: 09/19/22 0048    Lab Status: Final result Specimen: Blood from Arm, Left Updated: 09/19/22 0125     NT-proBNP 102 pg/mL     CK (with reflex to MB) [[id number]]  (Normal) Collected: 09/19/22 0048    Lab Status: Final result Specimen: Blood from Arm, Left Updated: 09/19/22 0125     Total  U/L     HS Troponin 0hr (reflex protocol) [[id number]]  (Normal) Collected: 09/19/22 0048    Lab Status: Final result Specimen: Blood from Arm, Left Updated: 09/19/22 0123     hs TnI 0hr <2 ng/L     Lactic acid, plasma [[id number]]  (Normal) Collected: 09/19/22 0048    Lab Status: Final result Specimen: Blood from Arm, Left Updated: 09/19/22 0118     LACTIC ACID 0 8 mmol/L     Narrative:      Result may be elevated if tourniquet was used during collection  D-dimer, quantitative [[id number]]  (Normal) Collected: 09/19/22 0048    Lab Status: Final result Specimen: Blood from Arm, Left Updated: 09/19/22 0112     D-Dimer, Quant 0 38 ug/ml FEU     Protime-INR [[id number]]  (Normal) Collected: 09/19/22 0048    Lab Status: Final result Specimen: Blood from Arm, Left Updated: 09/19/22 0111     Protime 12 8 seconds      INR 0 96    Blood culture #1 [[id number]] Collected: 09/19/22 0048    Lab Status: In process Specimen: Blood from Arm, Left Updated: 09/19/22 0103    Blood culture #2 [[id number]] Collected: 09/19/22 0048    Lab Status:  In process Specimen: Blood from Hand, Left Updated: 09/19/22 0103    CBC and differential [[id number]]  (Abnormal) Collected: 09/19/22 0048    Lab Status: Final result Specimen: Blood from Arm, Left Updated: 09/19/22 0100     WBC 9 39 Thousand/uL      RBC 4 56 Million/uL      Hemoglobin 14 2 g/dL      Hematocrit 41 6 %      MCV 91 fL      MCH 31 1 pg      MCHC 34 1 g/dL      RDW 13 1 %      MPV 9 9 fL      Platelets 486 Thousands/uL      nRBC 0 /100 WBCs      Neutrophils Relative 60 %      Immat GRANS % 0 %      Lymphocytes Relative 24 %      Monocytes Relative 8 %      Eosinophils Relative 7 %      Basophils Relative 1 %      Neutrophils Absolute 5 68 Thousands/µL      Immature Grans Absolute 0 03 Thousand/uL      Lymphocytes Absolute 2 24 Thousands/µL      Monocytes Absolute 0 75 Thousand/µL      Eosinophils Absolute 0 64 Thousand/µL      Basophils Absolute 0 05 Thousands/µL     Calcium, ionized [[id number]]  (Normal) Collected: 09/19/22 0048    Lab Status: Final result Specimen: Blood from Arm, Left Updated: 09/19/22 0100     Calcium, Ionized 1 18 mmol/L                  XR chest 1 view portable   ED Interpretation by Elliot Erickson DO (09/19 4984)   Left lower lobe atelectasis, no acute findings                        ED Course  ED Course as of 09/19/22 0300   Mon Sep 19, 2022   0249 Lab and imaging findings discussed with patient at bedside, he did have episodes of hypoxia in the emergency department, and required 4 L of oxygen via nasal cannula, on re-evaluation he does report feeling much better, however given his hypoxia admission was advised, patient states that he is unable to be admitted to the hospital at this time as he has 2 teenage children who are home alone and he borrowed someone's car to get here after work and therefore cannot stay, I did strongly stress the importance of adequate oxygenation to sustain proper organ function and life, advised patient that if his symptoms worsen and his oxygen levels continued to drop it could cause death, patient stated he understood this and still wished to be discharged home as his responsibilities at home would not allow for an admission at this time, patient was therefore provided with an albuterol inhaler to go, a prescription for further albuterol inhalers and p o  Steroids, he was strongly encouraged to return immediately if his symptoms worsen in any way or fail to improve over the next few days, patient did ambulate in the emergency department prior to discharge and reports feeling much better and continues to request discharge   0253 I did also strongly encouraged patient to discontinue smoking                               SBIRT 20yo+    Flowsheet Row Most Recent Value   SBIRT (23 yo +)    In order to provide better care to our patients, we are screening all of our patients for alcohol and drug use  Would it be okay to ask you these screening questions?  Yes Filed at: 09/19/2022 0011   Initial Alcohol Screen: US AUDIT-C 1  How often do you have a drink containing alcohol? 0 Filed at: 09/19/2022 0011   2  How many drinks containing alcohol do you have on a typical day you are drinking? 0 Filed at: 09/19/2022 0011   3a  Male UNDER 65: How often do you have five or more drinks on one occasion? 0 Filed at: 09/19/2022 0011   3b  FEMALE Any Age, or MALE 65+: How often do you have 4 or more drinks on one occassion? 0 Filed at: 09/19/2022 0011   Audit-C Score 0 Filed at: 09/19/2022 0011   MASOUD: How many times in the past year have you    Used an illegal drug or used a prescription medication for non-medical reasons? Never Filed at: 09/19/2022 0011                      Disposition  Final diagnoses: Moderate persistent asthma with exacerbation     Time reflects when diagnosis was documented in both MDM as applicable and the Disposition within this note     Time User Action Codes Description Comment    9/19/2022  2:49 AM Lauren Carvajal Add [J45 [id number]] Moderate persistent asthma with exacerbation       ED Disposition     ED Disposition   Discharge    Condition   Stable    Date/Time   Mon Sep 19, 2022  2:54 AM    Comment   Lizzette Veloz discharge to home/self care                 Follow-up Information     Follow up With Specialties Details Why Contact Info    Fady Moctezuma MD Pulmonary Disease, Pulmonology, [address] In 1 week  [address] [facility] [address] [facility]  [phone]            Discharge Medication List as of 9/19/2022  2:54 AM      START taking these medications    Details   !! albuterol (Ventolin HFA) 90 mcg/act inhaler Inhale 2 puffs every 4 (four) hours as needed for wheezing, Starting Mon 9/19/2022, Normal      predniSONE 20 mg tablet Multiple Dosages:Starting Mon 9/19/2022, Until Wed 9/21/2022 at 2359, THEN Starting Thu 9/22/2022, Until Sat 9/24/2022 at 2359, THEN Starting Sun 9/25/2022, Until Tue 9/27/2022 at 2359, THEN Starting Wed 9/28/2022, Until Fri 9/30/2022 at 2359Take 3  tablets (60 mg total) by mouth daily for 3 days, THEN 2 tablets (40 mg total) daily for 3 days, THEN 1 tablet (20 mg total) daily for 3 days, THEN 0 5 tablets (10 mg total) daily for 3 days  , Normal       !! - Potential duplicate medications found  Please discuss with provider  CONTINUE these medications which have NOT CHANGED    Details   Acetaminophen (Tylenol) 325 MG CAPS Take 1 capsule (325 mg total) by mouth 4 (four) times a day as needed (for pain), Starting Wed 2/3/2021, Normal      !! albuterol (PROVENTIL HFA,VENTOLIN HFA) 90 mcg/act inhaler Inhale 2 puffs every 4 (four) hours as needed for wheezing, Starting Wed 2/3/2021, Normal      docusate sodium (COLACE) 100 mg capsule Take 1 capsule (100 mg total) by mouth 2 (two) times a day as needed for constipation, Starting Wed 2/3/2021, Normal      levothyroxine 25 mcg tablet Take 1 tablet (25 mcg total) by mouth daily in the early morning, Starting Thu 2/4/2021, Normal      nicotine (NICODERM CQ) 21 mg/24 hr TD 24 hr patch Place 1 patch on the skin daily, Starting Wed 2/3/2021, Normal      nicotine polacrilex (NICORETTE) 2 mg gum For strong cravings, take 8-10 pieces (2mg) of gum per day  For weaker cravings, take 3-6 pieces of gum per day , Normal       !! - Potential duplicate medications found  Please discuss with provider                PDMP Review     None          ED Provider  Electronically Signed by           Vicenta Acosta,   09/19/22 [facility],   09/19/22 [id number]

## 2022-09-19 NOTE — ED NOTES
While talking pts SPO2 noted to be 90% on room air, oxygen applied at 2l via N/C        Vinny Anaya RN  09/19/22 LIZETH Pizarro 23 Rich Adams RN  09/19/22 [id number]

## 2022-09-24 LAB
BACTERIA BLD CULT: NORMAL
BACTERIA BLD CULT: NORMAL

## 2022-09-26 ENCOUNTER — TELEPHONE (OUTPATIENT)
Dept: PULMONOLOGY | Facility: CLINIC | Age: 47
End: 2022-09-26

## 2025-04-21 ENCOUNTER — APPOINTMENT (EMERGENCY)
Dept: NON INVASIVE DIAGNOSTICS | Facility: HOSPITAL | Age: 50
End: 2025-04-21
Payer: COMMERCIAL

## 2025-04-21 ENCOUNTER — HOSPITAL ENCOUNTER (EMERGENCY)
Facility: HOSPITAL | Age: 50
Discharge: HOME | End: 2025-04-21
Attending: EMERGENCY MEDICINE
Payer: COMMERCIAL

## 2025-04-21 ENCOUNTER — APPOINTMENT (EMERGENCY)
Dept: RADIOLOGY | Facility: HOSPITAL | Age: 50
End: 2025-04-21
Payer: COMMERCIAL

## 2025-04-21 VITALS
SYSTOLIC BLOOD PRESSURE: 124 MMHG | TEMPERATURE: 99.4 F | DIASTOLIC BLOOD PRESSURE: 99 MMHG | HEART RATE: 80 BPM | RESPIRATION RATE: 18 BRPM | WEIGHT: 211.64 LBS | OXYGEN SATURATION: 99 % | BODY MASS INDEX: 28.7 KG/M2

## 2025-04-21 DIAGNOSIS — M54.50 LOW BACK PAIN: ICD-10-CM

## 2025-04-21 DIAGNOSIS — M79.605 LEFT LEG PAIN: Primary | ICD-10-CM

## 2025-04-21 PROCEDURE — 72100 X-RAY EXAM L-S SPINE 2/3 VWS: CPT

## 2025-04-21 PROCEDURE — 99283 EMERGENCY DEPT VISIT LOW MDM: CPT

## 2025-04-21 PROCEDURE — 73502 X-RAY EXAM HIP UNI 2-3 VIEWS: CPT

## 2025-04-21 PROCEDURE — 93971 EXTREMITY STUDY: CPT

## 2025-04-21 PROCEDURE — 73560 X-RAY EXAM OF KNEE 1 OR 2: CPT

## 2025-04-21 PROCEDURE — 93971 EXTREMITY STUDY: CPT | Performed by: SURGERY

## 2025-04-21 PROCEDURE — 99285 EMERGENCY DEPT VISIT HI MDM: CPT | Performed by: PHYSICIAN ASSISTANT

## 2025-04-21 RX ORDER — TRAMADOL HYDROCHLORIDE 50 MG/1
50 TABLET ORAL EVERY 8 HOURS PRN
Qty: 6 TABLET | Refills: 0 | Status: SHIPPED | OUTPATIENT
Start: 2025-04-21 | End: 2025-04-24

## 2025-04-21 RX ORDER — LIDOCAINE 50 MG/G
2 PATCH TOPICAL ONCE
Status: DISCONTINUED | OUTPATIENT
Start: 2025-04-21 | End: 2025-04-21 | Stop reason: HOSPADM

## 2025-04-21 RX ORDER — ACETAMINOPHEN 325 MG/1
975 TABLET ORAL ONCE
Status: COMPLETED | OUTPATIENT
Start: 2025-04-21 | End: 2025-04-21

## 2025-04-21 RX ADMIN — LIDOCAINE 5% 2 PATCH: 700 PATCH TOPICAL at 14:24

## 2025-04-21 RX ADMIN — ACETAMINOPHEN 975 MG: 325 TABLET ORAL at 13:20

## 2025-04-21 NOTE — DISCHARGE INSTRUCTIONS
Rest when possible, use tylenol.  Additionally tramadol has been sent to your pharmacy to help with pain.  Do not operate any heavy machinery while take this medication as it may make you drowsy.  Also may be addicting and constipating so use with extreme caution.  Please follow-up with your orthopedic and our pain management specialist.  Additionally follow-up with family doctor.  Please return to the emergency department for any new or worsening symptoms.

## 2025-04-21 NOTE — Clinical Note
Obie Delgado was seen and treated in our emergency department on 4/21/2025.                Diagnosis:     Obie  may return to work on return date.    He may return on this date: 04/22/2025         If you have any questions or concerns, please don't hesitate to call.      Janae Mcclelland PA-C    ______________________________           _______________          _______________  Hospital Representative                              Date                                Time

## 2025-04-21 NOTE — ED PROVIDER NOTES
Time reflects when diagnosis was documented in both MDM as applicable and the Disposition within this note       Time User Action Codes Description Comment    4/21/2025  2:16 PM Janae Mcclelland Add [M79.605] Left leg pain     4/21/2025  2:25 PM Janae Mcclelland [M54.50] Low back pain           ED Disposition       ED Disposition   Discharge    Condition   Stable    Date/Time   Mon Apr 21, 2025  2:16 PM    Comment   Obie Danny discharge to home/self care.                   Assessment & Plan       Medical Decision Making  50-year-old male presented to the emergency department for evaluation of left leg pain.  Vitals and medical record reviewed.  Patient at risk for the following but not limited to DVT, fracture, contusion, bursitis, compartment syndrome, cellulitis.  Several of these diagnoses have been evaluated and ruled out by history and physical. As needed, patient will be further evaluated with laboratory and imaging studies. Higher level diagnostics, such as CT imaging or ultrasound, may also be required. Please see work-up portion of the note for further evaluation of patient's risk. Socioeconomic factors were also considered as part of the decision-making process. Unless otherwise stated in the chart or patient is admitted as elsewhere documented, any previously prescribed medications will be maintained.       Amount and/or Complexity of Data Reviewed  Radiology: ordered and independent interpretation performed.    Risk  OTC drugs.  Prescription drug management.        ED Course as of 04/21/25 2324 Mon Apr 21, 2025   1342 Duplex negative for DVT per vascular technician   2323 This is a late entry.  ED interpretation of x-rays were negative for acute osseous abnormality.  I do long discussion with the patient regarding all results and findings appropriate follow-up and strict return precautions and patient verbalized understanding.  Will follow-up with our pain management/spinal specialist.  Is agreed  with treatment plan he is clinically hemodynamically stable for discharge       Medications   acetaminophen (TYLENOL) tablet 975 mg (975 mg Oral Given 4/21/25 1320)       ED Risk Strat Scores                    No data recorded        SBIRT 20yo+      Flowsheet Row Most Recent Value   Initial Alcohol Screen: US AUDIT-C     1. How often do you have a drink containing alcohol? 0 Filed at: 04/21/2025 1038   2. How many drinks containing alcohol do you have on a typical day you are drinking?  0 Filed at: 04/21/2025 1038   3a. Male UNDER 65: How often do you have five or more drinks on one occasion? 0 Filed at: 04/21/2025 1038   Audit-C Score 0 Filed at: 04/21/2025 1038   MASOUD: How many times in the past year have you...    Used an illegal drug or used a prescription medication for non-medical reasons? Never Filed at: 04/21/2025 1038                            History of Present Illness       Chief Complaint   Patient presents with    Leg Pain     Patient reports L leg pain/ L hip/ L calf since Thursday, denies trauma. Taking tylenol without relief.   Reports minimal swelling denies redness, denies recent travel   Hx of DVT        Past Medical History:   Diagnosis Date    Acquired hypothyroidism     DVT (deep venous thrombosis) (HCC) left leg    History of DVT (deep vein thrombosis)     Hypertension     Hyperthyroidism     Pleural effusion, left 1/29/2021    SIRS (systemic inflammatory response syndrome) (HCC)     Tobacco abuse       Past Surgical History:   Procedure Laterality Date    FRACTURE SURGERY      IR CHEST TUBE PLACEMENT  1/30/2021      No family history on file.   Social History     Tobacco Use    Smoking status: Every Day     Current packs/day: 0.50     Types: Cigarettes    Smokeless tobacco: Never   Vaping Use    Vaping status: Never Used   Substance Use Topics    Alcohol use: Not Currently    Drug use: Yes     Types: Marijuana      E-Cigarette/Vaping    E-Cigarette Use Never User       E-Cigarette/Vaping  Substances      I have reviewed and agree with the history as documented.     50-year-old male presents to the emergency department for evaluation of left leg pain.  Vitals and medical record reviewed.  Patient states pain started on Thursday has been worsening since.  Denies any direct trauma or injury.  Reports a history of avascular necrosis in the right hip requiring prior surgical intervention.  States he also has history of DVT.  Denies any current anticoagulation.  Patient states area has been minimally swollen.  Denies any redness.  Denies recent travel.  He reports been taking Tylenol without significant improvement of symptoms.  Patient states pain seems to start in his lower back radiates to the left hip and into the left knee.  States the majority of his pain now though is in the lateral left calf.  Patient denies any chest pain palpitations or shortness of breath.        Review of Systems   Constitutional: Negative.    Respiratory: Negative.     Cardiovascular: Negative.    Musculoskeletal:  Positive for back pain.        Left leg pain   Skin: Negative.    Neurological: Negative.    All other systems reviewed and are negative.          Objective       ED Triage Vitals [04/21/25 1037]   Temperature Pulse Blood Pressure Respirations SpO2 Patient Position - Orthostatic VS   99.4 °F (37.4 °C) 90 (!) 193/116 20 99 % Sitting      Temp Source Heart Rate Source BP Location FiO2 (%) Pain Score    Temporal Monitor Right arm -- 8      Vitals      Date and Time Temp Pulse SpO2 Resp BP Pain Score FACES Pain Rating User   04/21/25 1350 -- 80 99 % 18 124/99 7 -- [facility]   04/21/25 1320 -- -- -- -- -- 6 -- SS   04/21/25 1257 -- 82 100 % 18 134/100 8 -- [facility]   04/21/25 1037 99.4 °F (37.4 °C) 90 99 % 20 193/116 8 -- KK            Physical Exam  Vitals and nursing note reviewed. Exam conducted with a chaperone present ([employer] tech).   Constitutional:       General: He is not in acute distress.     Appearance: Normal  appearance. He is not ill-appearing, toxic-appearing or diaphoretic.   HENT:      Head: Normocephalic.   Eyes:      Conjunctiva/sclera: Conjunctivae normal.   Pulmonary:      Effort: Pulmonary effort is normal.   Musculoskeletal:         General: Tenderness present. No swelling or deformity. Normal range of motion.      Cervical back: Normal.      Thoracic back: Normal.      Right lower leg: No edema.      Left lower leg: No edema.        Legs:       Comments: Diffuse lower back tenderness to palpation.  No point tenderness or step-off deformities.  No overlying skin changes.   Skin:     General: Skin is warm and dry.      Findings: No bruising, erythema or rash.   Neurological:      General: No focal deficit present.      Mental Status: He is alert and oriented to person, place, and time.   Psychiatric:         Mood and Affect: Mood normal.         Results Reviewed       None            XR spine lumbar 2 or 3 views injury   ED Interpretation by Janae Mcclelland PA-C (04/21 9536)   No acute findings      XR knee 1 or 2 views left   ED Interpretation by Janae Mcclelland PA-C (04/21 5885)   No acute findings      XR hip/pelv 2-3 vws left if performed   ED Interpretation by Janae Mcclelland PA-C (04/21 2738)   No acute findings      VAS VENOUS DUPLEX -LOWER LIMB UNILATERAL   Final Interpretation by Cornell Vera MD (04/21 6216)          Procedures    ED Medication and Procedure Management   Prior to Admission Medications   Prescriptions Last Dose Informant Patient Reported? Taking?   Acetaminophen (Tylenol) 325 MG CAPS   No No   Sig: Take 1 capsule (325 mg total) by mouth 4 (four) times a day as needed (for pain)   albuterol (PROVENTIL HFA,VENTOLIN HFA) 90 mcg/act inhaler   No No   Sig: Inhale 2 puffs every 4 (four) hours as needed for wheezing   albuterol (Ventolin HFA) 90 mcg/act inhaler   No No   Sig: Inhale 2 puffs every 4 (four) hours as needed for wheezing   docusate sodium (COLACE) 100 mg capsule   No  No   Sig: Take 1 capsule (100 mg total) by mouth 2 (two) times a day as needed for constipation   levothyroxine 25 mcg tablet   No No   Sig: Take 1 tablet (25 mcg total) by mouth daily in the early morning   nicotine (NICODERM CQ) 21 mg/24 hr TD 24 hr patch   No No   Sig: Place 1 patch on the skin daily   nicotine polacrilex (NICORETTE) 2 mg gum   No No   Sig: For strong cravings, take 8-10 pieces (2mg) of gum per day. For weaker cravings, take 3-6 pieces of gum per day.      Facility-Administered Medications: None     Discharge Medication List as of 4/21/2025  2:26 PM        START taking these medications    Details   traMADol (Ultram) 50 mg tablet Take 1 tablet (50 mg total) by mouth every 8 (eight) hours as needed for moderate pain for up to 3 days, Starting Mon 4/21/2025, Until Thu 4/24/2025 at 2359, Normal           CONTINUE these medications which have NOT CHANGED    Details   Acetaminophen (Tylenol) 325 MG CAPS Take 1 capsule (325 mg total) by mouth 4 (four) times a day as needed (for pain), Starting Wed 2/3/2021, Normal      !! albuterol (PROVENTIL HFA,VENTOLIN HFA) 90 mcg/act inhaler Inhale 2 puffs every 4 (four) hours as needed for wheezing, Starting Wed 2/3/2021, Normal      !! albuterol (Ventolin HFA) 90 mcg/act inhaler Inhale 2 puffs every 4 (four) hours as needed for wheezing, Starting Mon 9/19/2022, Normal      docusate sodium (COLACE) 100 mg capsule Take 1 capsule (100 mg total) by mouth 2 (two) times a day as needed for constipation, Starting Wed 2/3/2021, Normal      levothyroxine 25 mcg tablet Take 1 tablet (25 mcg total) by mouth daily in the early morning, Starting Thu 2/4/2021, Normal      nicotine (NICODERM CQ) 21 mg/24 hr TD 24 hr patch Place 1 patch on the skin daily, Starting Wed 2/3/2021, Normal      nicotine polacrilex (NICORETTE) 2 mg gum For strong cravings, take 8-10 pieces (2mg) of gum per day. For weaker cravings, take 3-6 pieces of gum per day., Normal       !! - Potential duplicate  medications found. Please discuss with provider.          ED SEPSIS DOCUMENTATION   Time reflects when diagnosis was documented in both MDM as applicable and the Disposition within this note       Time User Action Codes Description Comment    4/21/2025  2:16 PM Janae Mcclelland [M79.605] Left leg pain     4/21/2025  2:25 PM Janae Mcclelland [M54.50] Low back pain                  Janae Mcclelland PA-C  04/21/25 9066